# Patient Record
Sex: MALE | Race: ASIAN | NOT HISPANIC OR LATINO | URBAN - METROPOLITAN AREA
[De-identification: names, ages, dates, MRNs, and addresses within clinical notes are randomized per-mention and may not be internally consistent; named-entity substitution may affect disease eponyms.]

---

## 2021-05-24 VITALS
RESPIRATION RATE: 16 BRPM | HEIGHT: 69 IN | OXYGEN SATURATION: 97 % | SYSTOLIC BLOOD PRESSURE: 137 MMHG | HEART RATE: 68 BPM | WEIGHT: 169.98 LBS | TEMPERATURE: 98 F | DIASTOLIC BLOOD PRESSURE: 72 MMHG

## 2021-05-24 RX ORDER — CHLORHEXIDINE GLUCONATE 213 G/1000ML
1 SOLUTION TOPICAL ONCE
Refills: 0 | Status: DISCONTINUED | OUTPATIENT
Start: 2021-05-26 | End: 2021-05-26

## 2021-05-24 NOTE — H&P ADULT - HISTORY OF PRESENT ILLNESS
Cardiologist: Dr. Radha Leary  COVID:  Pharmacy: [facility] [address] [place] [address]  Escort:   History obtained via o/p office note  66 y/o M former smoker with PMH of HTN, HLD, DM, hepatitis B, PVD s/p PTA SFA and DEANNA, CAD s/p PCIs- RCA 2012 @ [place], PCI LAD and LCx 2016 @ [facility],   Who presented to her cardiologist DR. Leary with CC of dyspnea and mild chest tightness with exertion occurring on walking greater than 2 blocks or climbing greater than 1 flight of stairs and relieved with rest. Pt reports chest pain is located on substernal area, non-radiating lasting for a few minutes,   Also c/o b/l Leg claudication of the thighs with walking a couple of blocks X few months, worse with stairs and relieved with rest.   Pt. denies any …. SOB, palpitation, N/V, LE edema, syncope, PND/orthopnea. Subsequently, Pt. underwent NST 3/22/21 revealed EF 55 %, moderate amount of ischemia in anterior region on the stress images which was/were reversible in the rest images.  In light of patients risk factors, persistent CCS class 3  sx despite normal NST, pt, now presents for cardiac cath with possible intervention.    Cardiologist: Dr. Radha Leary  COVID:  Pharmacy: [facility] [address] [place] [address]  Escort:   History obtained via o/p office note  66 y/o M former smoker with PMH of HTN, HLD, DM, hepatitis B, PVD s/p PTA SFA and DEANNA, CAD s/p PCIs- RCA 2012 @ [place], PCI LAD and LCx 2016 @ [facility],   Who presented to her cardiologist DR. Leary with CC of dyspnea and mild chest tightness with exertion occurring on walking greater than 2 blocks or climbing greater than 1 flight of stairs and relieved with rest. Pt reports chest pain is located on substernal area, non-radiating lasting for a few minutes,   Also c/o b/l Leg claudication of the thighs with walking a couple of blocks X few months, worse with stairs and relieved with rest.   Pt. denies any …. SOB, palpitation, N/V, LE edema, syncope, PND/orthopnea. Subsequently, Pt. underwent NST 3/22/21 revealed EF 55 %, moderate amount of ischemia in anterior region on the stress images which was/were reversible in the rest images.  In light of patients risk factors, CCS class 3  sx ,abnormal NST, pt, now presents for cardiac cath with possible intervention.    Cardiologist: Dr. Radha Leary  COVID vaccine: Moderna 3/18/21  Pharmacy: [facility] [address] [place] [address]  Escort: Niece   History obtained via o/p office note  66 y/o Cantonese speaking M, current smoker with PMH of HTN, HLD, DM, hepatitis B, PVD s/p PTA SFA and DEANNA, CAD s/p PCIs- RCA 2012 @ [place], PCI LAD and LCx 2016 @ [facility] who presented to her cardiologist DR. Leary with CC of dyspnea and mild chest tightness with exertion occurring on walking greater than 2 blocks or climbing greater than 1 flight of stairs and relieved with rest. Pt reports chest pain is located on substernal area, non-radiating lasting for a few minutes,   Also c/o b/l Leg claudication of the thighs with walking a couple of blocks X few months, worse with stairs and relieved with rest. Pt. denies any SOB, palpitation, N/V, LE edema, syncope, PND/orthopnea. Subsequently pt. underwent NST 3/22/21 revealed EF 55 %, moderate amount of ischemia in anterior region on the stress images which was/were reversible in the rest images. In light of patients risk factors, CCS class 3  sx ,abnormal NST, pt, now presents for cardiac cath with possible intervention.

## 2021-05-24 NOTE — H&P ADULT - NSICDXPASTMEDICALHX_GEN_ALL_CORE_FT
PAST MEDICAL HISTORY:  CAD (coronary artery disease)     Diabetes mellitus     Hepatitis B     Hyperlipidemia     Hypertension     PVD (peripheral vascular disease)

## 2021-05-24 NOTE — H&P ADULT - ASSESSMENT
66 y/o Cantonese speaking M, current smoker with PMH of HTN, HLD, DM, hepatitis B, PVD s/p PTA SFA and DEANNA, CAD s/p PCIs- RCA 2012 @ [place], PCI LAD and LCx 2016 @ [facility] who  in light of patients risk factors, CCS class 3  sx ,abnormal NST, pt, now presents for cardiac cath with possible intervention.     ASA II, Mallampati II    Hgb/HCT: 14.5/44.9. Pt denies bleeding, melena, BRBPR, hematuria. Pt reports compliance with Aspirin 81mg PO QD, last dose 5/24. Last dose Xarelto 2.5mg PO QD on 5/24.   NS @ 100 cc/hr ordered. EF 55%. Pt is euvolemic on exam. Cr. 1.33 on labs from 5/24, today 1.07  MISS ordered     Pt is a candidate for moderate sedation: yes    Pt was consented with help of ray [place] in [facility].   Risks & benefits of procedure and alternative therapy have been explained to the patient including but not limited to: allergic reaction, bleeding w/possible need for blood transfusion, infection, renal and vascular compromise, limb damage, arrhythmia, stroke, vessel dissection/perforation, Myocardial infarction, emergent CABG. Informed consent obtained and in chart.

## 2021-05-24 NOTE — H&P ADULT - NSHPLABSRESULTS_GEN_ALL_CORE
EC.5   6.52  )-----------( 159      ( 26 May 2021 16:14 )             44.9           138  |  105  |  23  ----------------------------<  228<H>  4.0   |  23  |  1.07    Ca    9.8      26 May 2021 16:14    TPro  7.2  /  Alb  4.2  /  TBili  0.4  /  DBili  0.2  /  AST  14  /  ALT  16  /  AlkPhos  68  05-      PT/INR - ( 26 May 2021 16:14 )   PT: 10.5 sec;   INR: 0.87          PTT - ( 26 May 2021 16:14 )  PTT:30.9 sec ECG: NSR @ 68bpm, no STT changes.                                      14.5   6.52  )-----------( 159      ( 26 May 2021 16:14 )             44.9       05-26    138  |  105  |  23  ----------------------------<  228<H>  4.0   |  23  |  1.07    Ca    9.8      26 May 2021 16:14    TPro  7.2  /  Alb  4.2  /  TBili  0.4  /  DBili  0.2  /  AST  14  /  ALT  16  /  AlkPhos  68  05-26      PT/INR - ( 26 May 2021 16:14 )   PT: 10.5 sec;   INR: 0.87          PTT - ( 26 May 2021 16:14 )  PTT:30.9 sec

## 2021-05-26 ENCOUNTER — OUTPATIENT (OUTPATIENT)
Dept: OUTPATIENT SERVICES | Facility: HOSPITAL | Age: 66
LOS: 1 days | Discharge: NURSING, REHAB OR LONG-TERM CARE | End: 2021-05-26
Payer: MEDICARE

## 2021-05-26 DIAGNOSIS — Z98.890 OTHER SPECIFIED POSTPROCEDURAL STATES: Chronic | ICD-10-CM

## 2021-05-26 LAB
A1C WITH ESTIMATED AVERAGE GLUCOSE RESULT: 7.9 % — HIGH (ref 4–5.6)
ALBUMIN SERPL ELPH-MCNC: 3.9 G/DL — SIGNIFICANT CHANGE UP (ref 3.3–5)
ALBUMIN SERPL ELPH-MCNC: 4.2 G/DL — SIGNIFICANT CHANGE UP (ref 3.3–5)
ALP SERPL-CCNC: 63 U/L — SIGNIFICANT CHANGE UP (ref 40–120)
ALP SERPL-CCNC: 68 U/L — SIGNIFICANT CHANGE UP (ref 40–120)
ALT FLD-CCNC: 15 U/L — SIGNIFICANT CHANGE UP (ref 10–45)
ALT FLD-CCNC: 16 U/L — SIGNIFICANT CHANGE UP (ref 10–45)
ANION GAP SERPL CALC-SCNC: 10 MMOL/L — SIGNIFICANT CHANGE UP (ref 5–17)
ANION GAP SERPL CALC-SCNC: 8 MMOL/L — SIGNIFICANT CHANGE UP (ref 5–17)
APTT BLD: 30.9 SEC — SIGNIFICANT CHANGE UP (ref 27.5–35.5)
AST SERPL-CCNC: 14 U/L — SIGNIFICANT CHANGE UP (ref 10–40)
AST SERPL-CCNC: 15 U/L — SIGNIFICANT CHANGE UP (ref 10–40)
BASOPHILS # BLD AUTO: 0.02 K/UL — SIGNIFICANT CHANGE UP (ref 0–0.2)
BASOPHILS # BLD AUTO: 0.03 K/UL — SIGNIFICANT CHANGE UP (ref 0–0.2)
BASOPHILS NFR BLD AUTO: 0.3 % — SIGNIFICANT CHANGE UP (ref 0–2)
BASOPHILS NFR BLD AUTO: 0.5 % — SIGNIFICANT CHANGE UP (ref 0–2)
BILIRUB DIRECT SERPL-MCNC: 0.2 MG/DL — SIGNIFICANT CHANGE UP (ref 0–0.2)
BILIRUB INDIRECT FLD-MCNC: 0.2 MG/DL — SIGNIFICANT CHANGE UP (ref 0.2–1)
BILIRUB SERPL-MCNC: 0.4 MG/DL — SIGNIFICANT CHANGE UP (ref 0.2–1.2)
BILIRUB SERPL-MCNC: 0.5 MG/DL — SIGNIFICANT CHANGE UP (ref 0.2–1.2)
BUN SERPL-MCNC: 23 MG/DL — SIGNIFICANT CHANGE UP (ref 7–23)
BUN SERPL-MCNC: 23 MG/DL — SIGNIFICANT CHANGE UP (ref 7–23)
CALCIUM SERPL-MCNC: 9 MG/DL — SIGNIFICANT CHANGE UP (ref 8.4–10.5)
CALCIUM SERPL-MCNC: 9.8 MG/DL — SIGNIFICANT CHANGE UP (ref 8.4–10.5)
CHLORIDE SERPL-SCNC: 105 MMOL/L — SIGNIFICANT CHANGE UP (ref 96–108)
CHLORIDE SERPL-SCNC: 105 MMOL/L — SIGNIFICANT CHANGE UP (ref 96–108)
CHOLEST SERPL-MCNC: 177 MG/DL — SIGNIFICANT CHANGE UP
CO2 SERPL-SCNC: 23 MMOL/L — SIGNIFICANT CHANGE UP (ref 22–31)
CO2 SERPL-SCNC: 26 MMOL/L — SIGNIFICANT CHANGE UP (ref 22–31)
CREAT SERPL-MCNC: 0.99 MG/DL — SIGNIFICANT CHANGE UP (ref 0.5–1.3)
CREAT SERPL-MCNC: 1.07 MG/DL — SIGNIFICANT CHANGE UP (ref 0.5–1.3)
CRP SERPL-MCNC: <0.3 MG/L — SIGNIFICANT CHANGE UP (ref 0–4)
EOSINOPHIL # BLD AUTO: 0.09 K/UL — SIGNIFICANT CHANGE UP (ref 0–0.5)
EOSINOPHIL # BLD AUTO: 0.09 K/UL — SIGNIFICANT CHANGE UP (ref 0–0.5)
EOSINOPHIL NFR BLD AUTO: 1.4 % — SIGNIFICANT CHANGE UP (ref 0–6)
EOSINOPHIL NFR BLD AUTO: 1.5 % — SIGNIFICANT CHANGE UP (ref 0–6)
ESTIMATED AVERAGE GLUCOSE: 180 MG/DL — HIGH (ref 68–114)
GLUCOSE BLDC GLUCOMTR-MCNC: 177 MG/DL — HIGH (ref 70–99)
GLUCOSE BLDC GLUCOMTR-MCNC: 211 MG/DL — HIGH (ref 70–99)
GLUCOSE SERPL-MCNC: 205 MG/DL — HIGH (ref 70–99)
GLUCOSE SERPL-MCNC: 228 MG/DL — HIGH (ref 70–99)
HCT VFR BLD CALC: 44 % — SIGNIFICANT CHANGE UP (ref 39–50)
HCT VFR BLD CALC: 44.9 % — SIGNIFICANT CHANGE UP (ref 39–50)
HDLC SERPL-MCNC: 56 MG/DL — SIGNIFICANT CHANGE UP
HGB BLD-MCNC: 14.4 G/DL — SIGNIFICANT CHANGE UP (ref 13–17)
HGB BLD-MCNC: 14.5 G/DL — SIGNIFICANT CHANGE UP (ref 13–17)
IMM GRANULOCYTES NFR BLD AUTO: 0.5 % — SIGNIFICANT CHANGE UP (ref 0–1.5)
IMM GRANULOCYTES NFR BLD AUTO: 0.5 % — SIGNIFICANT CHANGE UP (ref 0–1.5)
INR BLD: 0.87 — LOW (ref 0.88–1.16)
LIPID PNL WITH DIRECT LDL SERPL: 87 MG/DL — SIGNIFICANT CHANGE UP
LYMPHOCYTES # BLD AUTO: 1.45 K/UL — SIGNIFICANT CHANGE UP (ref 1–3.3)
LYMPHOCYTES # BLD AUTO: 1.71 K/UL — SIGNIFICANT CHANGE UP (ref 1–3.3)
LYMPHOCYTES # BLD AUTO: 24.5 % — SIGNIFICANT CHANGE UP (ref 13–44)
LYMPHOCYTES # BLD AUTO: 26.2 % — SIGNIFICANT CHANGE UP (ref 13–44)
MCHC RBC-ENTMCNC: 30.2 PG — SIGNIFICANT CHANGE UP (ref 27–34)
MCHC RBC-ENTMCNC: 30.6 PG — SIGNIFICANT CHANGE UP (ref 27–34)
MCHC RBC-ENTMCNC: 32.3 GM/DL — SIGNIFICANT CHANGE UP (ref 32–36)
MCHC RBC-ENTMCNC: 32.7 GM/DL — SIGNIFICANT CHANGE UP (ref 32–36)
MCV RBC AUTO: 93.4 FL — SIGNIFICANT CHANGE UP (ref 80–100)
MCV RBC AUTO: 93.5 FL — SIGNIFICANT CHANGE UP (ref 80–100)
MONOCYTES # BLD AUTO: 0.49 K/UL — SIGNIFICANT CHANGE UP (ref 0–0.9)
MONOCYTES # BLD AUTO: 0.64 K/UL — SIGNIFICANT CHANGE UP (ref 0–0.9)
MONOCYTES NFR BLD AUTO: 8.3 % — SIGNIFICANT CHANGE UP (ref 2–14)
MONOCYTES NFR BLD AUTO: 9.8 % — SIGNIFICANT CHANGE UP (ref 2–14)
NEUTROPHILS # BLD AUTO: 3.83 K/UL — SIGNIFICANT CHANGE UP (ref 1.8–7.4)
NEUTROPHILS # BLD AUTO: 4.02 K/UL — SIGNIFICANT CHANGE UP (ref 1.8–7.4)
NEUTROPHILS NFR BLD AUTO: 61.6 % — SIGNIFICANT CHANGE UP (ref 43–77)
NEUTROPHILS NFR BLD AUTO: 64.9 % — SIGNIFICANT CHANGE UP (ref 43–77)
NON HDL CHOLESTEROL: 121 MG/DL — SIGNIFICANT CHANGE UP
NRBC # BLD: 0 /100 WBCS — SIGNIFICANT CHANGE UP (ref 0–0)
NRBC # BLD: 0 /100 WBCS — SIGNIFICANT CHANGE UP (ref 0–0)
PLATELET # BLD AUTO: 153 K/UL — SIGNIFICANT CHANGE UP (ref 150–400)
PLATELET # BLD AUTO: 159 K/UL — SIGNIFICANT CHANGE UP (ref 150–400)
POTASSIUM SERPL-MCNC: 3.7 MMOL/L — SIGNIFICANT CHANGE UP (ref 3.5–5.3)
POTASSIUM SERPL-MCNC: 4 MMOL/L — SIGNIFICANT CHANGE UP (ref 3.5–5.3)
POTASSIUM SERPL-SCNC: 3.7 MMOL/L — SIGNIFICANT CHANGE UP (ref 3.5–5.3)
POTASSIUM SERPL-SCNC: 4 MMOL/L — SIGNIFICANT CHANGE UP (ref 3.5–5.3)
PROT SERPL-MCNC: 6.7 G/DL — SIGNIFICANT CHANGE UP (ref 6–8.3)
PROT SERPL-MCNC: 7.2 G/DL — SIGNIFICANT CHANGE UP (ref 6–8.3)
PROTHROM AB SERPL-ACNC: 10.5 SEC — LOW (ref 10.6–13.6)
RBC # BLD: 4.71 M/UL — SIGNIFICANT CHANGE UP (ref 4.2–5.8)
RBC # BLD: 4.8 M/UL — SIGNIFICANT CHANGE UP (ref 4.2–5.8)
RBC # FLD: 13 % — SIGNIFICANT CHANGE UP (ref 10.3–14.5)
RBC # FLD: 13.2 % — SIGNIFICANT CHANGE UP (ref 10.3–14.5)
SODIUM SERPL-SCNC: 138 MMOL/L — SIGNIFICANT CHANGE UP (ref 135–145)
SODIUM SERPL-SCNC: 139 MMOL/L — SIGNIFICANT CHANGE UP (ref 135–145)
TRIGL SERPL-MCNC: 168 MG/DL — HIGH
WBC # BLD: 5.91 K/UL — SIGNIFICANT CHANGE UP (ref 3.8–10.5)
WBC # BLD: 6.52 K/UL — SIGNIFICANT CHANGE UP (ref 3.8–10.5)
WBC # FLD AUTO: 5.91 K/UL — SIGNIFICANT CHANGE UP (ref 3.8–10.5)
WBC # FLD AUTO: 6.52 K/UL — SIGNIFICANT CHANGE UP (ref 3.8–10.5)

## 2021-05-26 PROCEDURE — 92978 ENDOLUMINL IVUS OCT C 1ST: CPT | Mod: RC

## 2021-05-26 PROCEDURE — 85730 THROMBOPLASTIN TIME PARTIAL: CPT

## 2021-05-26 PROCEDURE — C9600: CPT | Mod: RC

## 2021-05-26 PROCEDURE — C1874: CPT

## 2021-05-26 PROCEDURE — 86140 C-REACTIVE PROTEIN: CPT

## 2021-05-26 PROCEDURE — 82962 GLUCOSE BLOOD TEST: CPT

## 2021-05-26 PROCEDURE — 93010 ELECTROCARDIOGRAM REPORT: CPT

## 2021-05-26 PROCEDURE — 80061 LIPID PANEL: CPT

## 2021-05-26 PROCEDURE — C1725: CPT

## 2021-05-26 PROCEDURE — 99221 1ST HOSP IP/OBS SF/LOW 40: CPT

## 2021-05-26 PROCEDURE — 85025 COMPLETE CBC W/AUTO DIFF WBC: CPT

## 2021-05-26 PROCEDURE — C1887: CPT

## 2021-05-26 PROCEDURE — C1769: CPT

## 2021-05-26 PROCEDURE — C1894: CPT

## 2021-05-26 PROCEDURE — 93454 CORONARY ARTERY ANGIO S&I: CPT | Mod: 59

## 2021-05-26 PROCEDURE — 82248 BILIRUBIN DIRECT: CPT

## 2021-05-26 PROCEDURE — 93005 ELECTROCARDIOGRAM TRACING: CPT

## 2021-05-26 PROCEDURE — 83036 HEMOGLOBIN GLYCOSYLATED A1C: CPT

## 2021-05-26 PROCEDURE — C1753: CPT

## 2021-05-26 PROCEDURE — 80053 COMPREHEN METABOLIC PANEL: CPT

## 2021-05-26 PROCEDURE — 85610 PROTHROMBIN TIME: CPT

## 2021-05-26 RX ORDER — DEXTROSE 50 % IN WATER 50 %
25 SYRINGE (ML) INTRAVENOUS ONCE
Refills: 0 | Status: DISCONTINUED | OUTPATIENT
Start: 2021-05-26 | End: 2021-05-26

## 2021-05-26 RX ORDER — SODIUM CHLORIDE 9 MG/ML
500 INJECTION INTRAMUSCULAR; INTRAVENOUS; SUBCUTANEOUS
Refills: 0 | Status: DISCONTINUED | OUTPATIENT
Start: 2021-05-26 | End: 2021-05-26

## 2021-05-26 RX ORDER — ASPIRIN/CALCIUM CARB/MAGNESIUM 324 MG
1 TABLET ORAL
Qty: 30 | Refills: 11
Start: 2021-05-26 | End: 2022-05-20

## 2021-05-26 RX ORDER — INSULIN LISPRO 100/ML
VIAL (ML) SUBCUTANEOUS ONCE
Refills: 0 | Status: COMPLETED | OUTPATIENT
Start: 2021-05-26 | End: 2021-05-26

## 2021-05-26 RX ORDER — CLOPIDOGREL BISULFATE 75 MG/1
600 TABLET, FILM COATED ORAL ONCE
Refills: 0 | Status: COMPLETED | OUTPATIENT
Start: 2021-05-26 | End: 2021-05-26

## 2021-05-26 RX ORDER — GLUCAGON INJECTION, SOLUTION 0.5 MG/.1ML
1 INJECTION, SOLUTION SUBCUTANEOUS ONCE
Refills: 0 | Status: DISCONTINUED | OUTPATIENT
Start: 2021-05-26 | End: 2021-05-26

## 2021-05-26 RX ORDER — SODIUM CHLORIDE 9 MG/ML
1000 INJECTION, SOLUTION INTRAVENOUS
Refills: 0 | Status: DISCONTINUED | OUTPATIENT
Start: 2021-05-26 | End: 2021-05-26

## 2021-05-26 RX ORDER — METOPROLOL TARTRATE 50 MG
100 TABLET ORAL ONCE
Refills: 0 | Status: COMPLETED | OUTPATIENT
Start: 2021-05-26 | End: 2021-05-26

## 2021-05-26 RX ORDER — CLOPIDOGREL BISULFATE 75 MG/1
1 TABLET, FILM COATED ORAL
Qty: 30 | Refills: 11
Start: 2021-05-26 | End: 2022-05-20

## 2021-05-26 RX ORDER — DEXTROSE 50 % IN WATER 50 %
12.5 SYRINGE (ML) INTRAVENOUS ONCE
Refills: 0 | Status: DISCONTINUED | OUTPATIENT
Start: 2021-05-26 | End: 2021-05-26

## 2021-05-26 RX ORDER — ASPIRIN/CALCIUM CARB/MAGNESIUM 324 MG
81 TABLET ORAL ONCE
Refills: 0 | Status: COMPLETED | OUTPATIENT
Start: 2021-05-26 | End: 2021-05-26

## 2021-05-26 RX ORDER — DEXTROSE 50 % IN WATER 50 %
15 SYRINGE (ML) INTRAVENOUS ONCE
Refills: 0 | Status: DISCONTINUED | OUTPATIENT
Start: 2021-05-26 | End: 2021-05-26

## 2021-05-26 RX ADMIN — Medication 81 MILLIGRAM(S): at 17:05

## 2021-05-26 RX ADMIN — CLOPIDOGREL BISULFATE 600 MILLIGRAM(S): 75 TABLET, FILM COATED ORAL at 17:06

## 2021-05-26 RX ADMIN — Medication 2: at 19:14

## 2021-05-26 RX ADMIN — Medication 100 MILLIGRAM(S): at 18:47

## 2021-05-26 RX ADMIN — SODIUM CHLORIDE 100 MILLILITER(S): 9 INJECTION INTRAMUSCULAR; INTRAVENOUS; SUBCUTANEOUS at 17:06

## 2021-05-26 RX ADMIN — SODIUM CHLORIDE 100 MILLILITER(S): 9 INJECTION INTRAMUSCULAR; INTRAVENOUS; SUBCUTANEOUS at 19:09

## 2021-05-26 NOTE — CONSULT NOTE ADULT - SUBJECTIVE AND OBJECTIVE BOX
Surgeon:    Requesting Physician:    HISTORY OF PRESENT ILLNESS:  65y Male    PAST MEDICAL & SURGICAL HISTORY:  Hypertension    Hyperlipidemia    Hepatitis B    PVD (peripheral vascular disease)    Diabetes mellitus    CAD (coronary artery disease)    H/O eye surgery        MEDICATIONS  (STANDING):  chlorhexidine 4% Liquid 1 Application(s) Topical once  dextrose 40% Gel 15 Gram(s) Oral Once  dextrose 5%. 1000 milliLiter(s) (50 mL/Hr) IV Continuous <Continuous>  dextrose 5%. 1000 milliLiter(s) (100 mL/Hr) IV Continuous <Continuous>  dextrose 50% Injectable 25 Gram(s) IV Push Once  dextrose 50% Injectable 12.5 Gram(s) IV Push Once  dextrose 50% Injectable 25 Gram(s) IV Push Once  glucagon  Injectable 1 milliGRAM(s) IntraMuscular Once  insulin lispro (ADMELOG) corrective regimen sliding scale   SubCutaneous Once  sodium chloride 0.9%. 500 milliLiter(s) (100 mL/Hr) IV Continuous <Continuous>    MEDICATIONS  (PRN):      Allergies    No Known Allergies    Intolerances        SOCIAL HISTORY:  Smoker:  YES / NO        PACK YEARS:                         WHEN QUIT?  ETOH use:  YES / NO               FREQUENCY / QUANTITY:  Ilicit Drug use:  YES / NO  Occupation:  Assisted device use (Cane / Walker):  Live with:    FAMILY HISTORY:  No pertinent family history in first degree relatives        Review of Systems  CONSTITUTIONAL:  Fevers / chills, sweats, fatigue, weight loss, weight gain                                    NEGATIVE  NEURO:  parathesias, seizures, syncope, confusion                                                                               NEGATIVE  EYES:  Blurry vision, discharge, pain, loss of vision                                                                                  NEGATIVE  ENMT:  Difficulty hearing, vertigo, dysphagia, epistaxis, recent dental work                                     NEGATIVE  CV:  Chest pain, palpitations, ALLRED, orthopnea                                                                                         NEGATIVE  RESPIRATORY:  Wheezing, SOB, cough / sputum, hemoptysis                                                              NEGATIVE  GI:  Nausea, vommiting, diarrhea, constipation, melena                                                                        NEGATIVE  : Hematuria, dysuria, urgency, incontinence                                                                                       NEGATIVE  MUSKULOSKELETAL:  arthritis, joint swelling, muscle weakness                                                           NEGATIVE  SKIN/BREAST:  rash, itching, humberto loss, masses                                                                                            NEGATIVE  PSYCH:  depresion, anxiety, suicidal ideation                                                                                             NEGATIVE  HEME/LYMPH:  bruises easily, enlarged lymph nodes, tender lymph nodes                                        NEGATIVE  ENDOCRINE:  cold intolerance, heat intolerance, polydipsia                                                                   NEGATIVE    PHYSICAL EXAM  Vital Signs Last 24 Hrs  T(C): --  T(F): --  HR: --  BP: --  BP(mean): --  RR: --  SpO2: --    CONSTITUTIONAL:                                                                          WNL  NEURO:                                                                                             WNL                      EYES:                                                                                                  WNL  ENMT:                                                                                                WNL  CV:                                                                                                      WNL  RESPIRATORY:                                                                                  WNL  GI:                                                                                                       WNL  : CARBAJAL + / -                                                                                 WNL  MUSKULOSKELETAL:                                                                       WNL  SKIN / BREAST:                                                                                 WNL                                                          LABS:                        14.5   6.52  )-----------( 159      ( 26 May 2021 16:14 )             44.9     05-26    138  |  105  |  23  ----------------------------<  228<H>  4.0   |  23  |  1.07    Ca    9.8      26 May 2021 16:14    TPro  7.2  /  Alb  4.2  /  TBili  0.4  /  DBili  0.2  /  AST  14  /  ALT  16  /  AlkPhos  68  05-26    PT/INR - ( 26 May 2021 16:14 )   PT: 10.5 sec;   INR: 0.87    PTT - ( 26 May 2021 16:14 )  PTT:30.9 sec      RADIOLOGY & ADDITIONAL STUDIES:  CAROTID U/S:    CXR:    CT Scan:    EKG:    TTE / YONY:    Cardiac Cath: Surgeon: MD Jignesh    Requesting Physician: MD Thor    HISTORY OF PRESENT ILLNESS:  65y Male who is a formal smoker with PMH significant for HTN, HLD, DMT2, hepatitis B, PVD with s/p PTA SFA and DEANNA, CAD with s/p PCIs to RCA (2012, [place]), PCI to LAD and Lcx (2016 at [facility]) and TR presented to CT surgery with findings of worsening coronary stenosis with symptoms of decrease daily activities, shortness of breath, chest tightness on exertion, chest pain at mid sternum without radiating, LE edema and claudication.  Patient underwent a diagnostic catheterization and PCI to RCA at this admission.  Patient tolerated the procedure well.  At visit, patient is hemodynamically stable with complains of some chest tightness which is re-ensure a normal sensation post procedures.  Patient otherwise denies shortness of breath, claudication, or numbness of extremities.         PAST MEDICAL & SURGICAL HISTORY:  Hypertension    Hyperlipidemia    Hepatitis B    PVD (peripheral vascular disease)    Diabetes mellitus    CAD (coronary artery disease)    H/O eye surgery        MEDICATIONS  (STANDING):  chlorhexidine 4% Liquid 1 Application(s) Topical once  dextrose 40% Gel 15 Gram(s) Oral Once  dextrose 5%. 1000 milliLiter(s) (50 mL/Hr) IV Continuous <Continuous>  dextrose 5%. 1000 milliLiter(s) (100 mL/Hr) IV Continuous <Continuous>  dextrose 50% Injectable 25 Gram(s) IV Push Once  dextrose 50% Injectable 12.5 Gram(s) IV Push Once  dextrose 50% Injectable 25 Gram(s) IV Push Once  glucagon  Injectable 1 milliGRAM(s) IntraMuscular Once  insulin lispro (ADMELOG) corrective regimen sliding scale   SubCutaneous Once  sodium chloride 0.9%. 500 milliLiter(s) (100 mL/Hr) IV Continuous <Continuous>    MEDICATIONS  (PRN):      Allergies    No Known Allergies    Intolerances        SOCIAL HISTORY:  Smoker: formal smoking   ETOH use: no  Ilicit Drug use:  YES / NO  Occupation:  Assisted device use (Cane / Walker):  Live with:    FAMILY HISTORY:  No pertinent family history in first degree relatives        Review of Systems  CONSTITUTIONAL:  Fevers / chills, sweats, fatigue, weight loss, weight gain                                    NEURO:  parathesias, seizures, syncope, confusion                                                                               EYES:  Blurry vision, discharge, pain, loss of vision                                                                                  ENMT:  Difficulty hearing, vertigo, dysphagia, epistaxis, recent dental work                                       CV:  Chest pain, palpitations, ALLRED, orthopnea                                                                                           RESPIRATORY:  Wheezing, SOB, cough / sputum, hemoptysis                                                              GI:  Nausea, vommiting, diarrhea, constipation, melena                                                                         : Hematuria, dysuria, urgency, incontinence                                                                                        MUSKULOSKELETAL:  arthritis, joint swelling, muscle weakness                                                             SKIN/BREAST:  rash, itching, humberto loss, masses                                                                                          PSYCH:  depresion, anxiety, suicidal ideation                                                                                             HEME/LYMPH:  bruises easily, enlarged lymph nodes, tender lymph nodes                                         ENDOCRINE:  cold intolerance, heat intolerance, polydipsia                                                                      · Temp (F)	97.9 Degrees F  · Temp (C)	36.6 Degrees C  · Heart Rate	68 /min  · Respiration Rate (breaths/min)	16 /min  · BP Systolic	137 mm Hg  · BP Diastolic	72 mm Hg  · Blood Pressure - Method	electronic  · BP Noninvasive Mean	93 mm Hg  · SpO2 (%)	97 %  · O2 Delivery/Oxygen Delivery Method	room air     CONSTITUTIONAL: NAD.  WDWN                                                                   NEURO:     grossly intact                                                                                                     EYES:   slight closed.  left eye decrease vision                                                                                         ENMT:  supple and non lymphadenopathy                                                                                            CV:     RRR without murmur                                                                                                   RESPIRATORY:   no wheezing and non rhonchi                                                                          GI:         obese and non distended                                                                                              :  deferred                                                                                MUSKULOSKELETAL: no deformity                                                                        SKIN / BREAST:      warm                                                                                                                                    LABS:                        14.5   6.52  )-----------( 159      ( 26 May 2021 16:14 )             44.9     05-26    138  |  105  |  23  ----------------------------<  228<H>  4.0   |  23  |  1.07    Ca    9.8      26 May 2021 16:14    TPro  7.2  /  Alb  4.2  /  TBili  0.4  /  DBili  0.2  /  AST  14  /  ALT  16  /  AlkPhos  68  05-26    PT/INR - ( 26 May 2021 16:14 )   PT: 10.5 sec;   INR: 0.87    PTT - ( 26 May 2021 16:14 )  PTT:30.9 sec      RADIOLOGY & ADDITIONAL STUDIES:  EKG   performed and reviewed     Cardiac Cath:  3-V coronary stenosis     Echo

## 2021-05-26 NOTE — CONSULT NOTE ADULT - ASSESSMENT
65y Male who is a formal smoker with PMH significant for HTN, HLD, DMT2, hepatitis B, PVD with s/p PTA SFA and DEANNA, CAD with s/p PCIs to RCA (2012, [place]), PCI to LAD and Lcx (2016 at [facility]) and TR presented to CT surgery with findings of worsening coronary stenosis with symptoms of decrease daily activities, shortness of breath, chest tightness on exertion, chest pain at mid sternum without radiating, LE edema and claudication.  Patient underwent a diagnostic catheterization and PCI to RCA at this admission.  Patient tolerated the procedure well.  At visit, patient is hemodynamically stable with complains of some chest tightness which is re-ensure a normal sensation post procedures.  Patient otherwise denies shortness of breath, claudication, or numbness of extremities.         Problem #1 CAD with s/p PCI to RCA  - surgical intervention with Dr. Egan in near future   - continue ASA/Plavix post procedures  - continue statin post procedures  - continue beta blocker.  consider to switch to metoprolol  - continue to monitor signs and symptoms of Myocardial ischemia     Problem #2 PVD with claudication   - continue walking per tolerance to increase perfusion     Problem #3 DMT2  - continue current anti-hyperglycemic agent   - well control blood sugar prior to the procedures     Problem #4 LE edema   - consider gentle diuresis for now

## 2021-05-26 NOTE — PROGRESS NOTE ADULT - SUBJECTIVE AND OBJECTIVE BOX
Interventional Cardiology PA Post PCI SDA Discharge Note      Patient without complaints. Ambulated and voided without difficulties    Afebrile, VSS    Ext:		Right/Left              Radial :    hematoma,     bleeding, dressing; C/D/I      Pulses:    intact RAD/DP/PT to baseline     s/p cardiac cath on 5/26/21 ASHWIN bong MaciasLABRANDON ISR     1.	Follow-up with PMD/Cardiologist ___________ in 72 hours.  2.                 Post procedure labs/EKG reviewed and stable.    3.                 Pt given instructions on importance of taking antiplatelet medication.    4. 	Stable for discharge as per attending . _________ after bed rest, pt voids, groin/wrist stable and 30 minutes of ambulation.  5.                 Prescriptions for Aspirin/Plavix/Brilinta/Effient e-prescribed and submitted to patient's pharmacy.    6.                 Patient will continue statin (Name and dose).  No Statin Prescribed due to ____________.  7.	Discharge forms signed and copies in chart     Interventional Cardiology PA Post PCI SDA Discharge Note      Patient without complaints. Ambulated and voided without difficulties    Afebrile, VSS    Ext:		Right/Left              Radial :    hematoma,     bleeding, dressing; C/D/I      Pulses:    intact RAD/DP/PT to baseline     s/p cardiac cath on 5/26/21 ASHWIN proxRCA LM norm, proxLAD 95% ISR, proxLCx 80-85% prixRCA 85% in stent segment, LVEDP 13mmHg     Will follow up w/ Dr. Egan for Mid CAB    1.	Follow-up with PMD/Cardiologist Dr Leary  in 72 hours.  2.                 Post procedure labs/EKG reviewed and stable.    3.                 Pt given instructions on importance of taking antiplatelet medication.    4. 	Stable for discharge as per attending Dr. Mcrae after bed rest, pt voids, groin/wrist stable and 30 minutes of ambulation.  5.                 Prescriptions for Aspirin/Plavix e-prescribed and submitted to patient's pharmacy.    6.                 Patient will continue vascepa   7.	Discharge forms signed and copies in chart

## 2021-05-28 PROBLEM — E78.5 HYPERLIPIDEMIA, UNSPECIFIED: Chronic | Status: ACTIVE | Noted: 2021-05-26

## 2021-05-28 PROBLEM — I10 ESSENTIAL (PRIMARY) HYPERTENSION: Chronic | Status: ACTIVE | Noted: 2021-05-26

## 2021-05-28 PROBLEM — B19.10 UNSPECIFIED VIRAL HEPATITIS B WITHOUT HEPATIC COMA: Chronic | Status: ACTIVE | Noted: 2021-05-26

## 2021-05-28 PROBLEM — I73.9 PERIPHERAL VASCULAR DISEASE, UNSPECIFIED: Chronic | Status: ACTIVE | Noted: 2021-05-26

## 2021-05-28 PROBLEM — E11.9 TYPE 2 DIABETES MELLITUS WITHOUT COMPLICATIONS: Chronic | Status: ACTIVE | Noted: 2021-05-26

## 2021-05-28 PROBLEM — Z00.00 ENCOUNTER FOR PREVENTIVE HEALTH EXAMINATION: Status: ACTIVE | Noted: 2021-05-28

## 2021-05-28 PROBLEM — I25.10 ATHEROSCLEROTIC HEART DISEASE OF NATIVE CORONARY ARTERY WITHOUT ANGINA PECTORIS: Chronic | Status: ACTIVE | Noted: 2021-05-26

## 2021-06-01 VITALS — BODY MASS INDEX: 25.18 KG/M2 | WEIGHT: 169.98 LBS | HEIGHT: 69.02 IN

## 2021-06-01 DIAGNOSIS — Z86.79 PERSONAL HISTORY OF OTHER DISEASES OF THE CIRCULATORY SYSTEM: ICD-10-CM

## 2021-06-01 DIAGNOSIS — Z86.39 PERSONAL HISTORY OF OTHER ENDOCRINE, NUTRITIONAL AND METABOLIC DISEASE: ICD-10-CM

## 2021-06-01 DIAGNOSIS — F17.200 NICOTINE DEPENDENCE, UNSPECIFIED, UNCOMPLICATED: ICD-10-CM

## 2021-06-01 DIAGNOSIS — Z86.19 PERSONAL HISTORY OF OTHER INFECTIOUS AND PARASITIC DISEASES: ICD-10-CM

## 2021-06-01 DIAGNOSIS — Z87.438 PERSONAL HISTORY OF OTHER DISEASES OF MALE GENITAL ORGANS: ICD-10-CM

## 2021-06-01 DIAGNOSIS — Z95.5 PRESENCE OF CORONARY ANGIOPLASTY IMPLANT AND GRAFT: ICD-10-CM

## 2021-06-01 DIAGNOSIS — I20.9 ANGINA PECTORIS, UNSPECIFIED: ICD-10-CM

## 2021-06-01 DIAGNOSIS — I25.10 ATHEROSCLEROTIC HEART DISEASE OF NATIVE CORONARY ARTERY W/OUT ANGINA PECTORIS: ICD-10-CM

## 2021-06-01 DIAGNOSIS — T82.855A STENOSIS OF CORONARY ARTERY STENT, INITIAL ENCOUNTER: ICD-10-CM

## 2021-06-02 DIAGNOSIS — T82.855A STENOSIS OF CORONARY ARTERY STENT, INITIAL ENCOUNTER: ICD-10-CM

## 2021-06-02 DIAGNOSIS — I73.9 PERIPHERAL VASCULAR DISEASE, UNSPECIFIED: ICD-10-CM

## 2021-06-02 DIAGNOSIS — I25.10 ATHEROSCLEROTIC HEART DISEASE OF NATIVE CORONARY ARTERY WITHOUT ANGINA PECTORIS: ICD-10-CM

## 2021-06-02 DIAGNOSIS — Z95.5 PRESENCE OF CORONARY ANGIOPLASTY IMPLANT AND GRAFT: ICD-10-CM

## 2021-06-02 DIAGNOSIS — E78.5 HYPERLIPIDEMIA, UNSPECIFIED: ICD-10-CM

## 2021-06-02 DIAGNOSIS — I10 ESSENTIAL (PRIMARY) HYPERTENSION: ICD-10-CM

## 2021-06-08 ENCOUNTER — NON-APPOINTMENT (OUTPATIENT)
Age: 66
End: 2021-06-08

## 2021-06-08 ENCOUNTER — OUTPATIENT (OUTPATIENT)
Dept: OUTPATIENT SERVICES | Facility: HOSPITAL | Age: 66
LOS: 1 days | End: 2021-06-08
Payer: MEDICARE

## 2021-06-08 ENCOUNTER — APPOINTMENT (OUTPATIENT)
Dept: CARDIOTHORACIC SURGERY | Facility: CLINIC | Age: 66
End: 2021-06-08
Payer: MEDICARE

## 2021-06-08 VITALS
HEART RATE: 93 BPM | OXYGEN SATURATION: 95 % | BODY MASS INDEX: 33.77 KG/M2 | SYSTOLIC BLOOD PRESSURE: 134 MMHG | WEIGHT: 172 LBS | HEIGHT: 60 IN | TEMPERATURE: 98 F | DIASTOLIC BLOOD PRESSURE: 75 MMHG | RESPIRATION RATE: 17 BRPM

## 2021-06-08 DIAGNOSIS — Z98.890 OTHER SPECIFIED POSTPROCEDURAL STATES: Chronic | ICD-10-CM

## 2021-06-08 LAB
A1C WITH ESTIMATED AVERAGE GLUCOSE RESULT: 7.9 % — HIGH (ref 4–5.6)
ALBUMIN SERPL ELPH-MCNC: 4.2 G/DL — SIGNIFICANT CHANGE UP (ref 3.3–5)
ALP SERPL-CCNC: 74 U/L — SIGNIFICANT CHANGE UP (ref 40–120)
ALT FLD-CCNC: 17 U/L — SIGNIFICANT CHANGE UP (ref 10–45)
ANION GAP SERPL CALC-SCNC: 10 MMOL/L — SIGNIFICANT CHANGE UP (ref 5–17)
APPEARANCE UR: CLEAR — SIGNIFICANT CHANGE UP
APTT BLD: 29.5 SEC — SIGNIFICANT CHANGE UP (ref 27.5–35.5)
AST SERPL-CCNC: 15 U/L — SIGNIFICANT CHANGE UP (ref 10–40)
BACTERIA # UR AUTO: PRESENT /HPF
BASOPHILS # BLD AUTO: 0.03 K/UL — SIGNIFICANT CHANGE UP (ref 0–0.2)
BASOPHILS NFR BLD AUTO: 0.5 % — SIGNIFICANT CHANGE UP (ref 0–2)
BILIRUB SERPL-MCNC: 0.3 MG/DL — SIGNIFICANT CHANGE UP (ref 0.2–1.2)
BILIRUB UR-MCNC: NEGATIVE — SIGNIFICANT CHANGE UP
BUN SERPL-MCNC: 32 MG/DL — HIGH (ref 7–23)
CALCIUM SERPL-MCNC: 9.4 MG/DL — SIGNIFICANT CHANGE UP (ref 8.4–10.5)
CHLORIDE SERPL-SCNC: 104 MMOL/L — SIGNIFICANT CHANGE UP (ref 96–108)
CHOLEST SERPL-MCNC: 165 MG/DL — SIGNIFICANT CHANGE UP
CO2 SERPL-SCNC: 25 MMOL/L — SIGNIFICANT CHANGE UP (ref 22–31)
COD CRY URNS QL: ABNORMAL /HPF
COLOR SPEC: YELLOW — SIGNIFICANT CHANGE UP
CREAT SERPL-MCNC: 1.21 MG/DL — SIGNIFICANT CHANGE UP (ref 0.5–1.3)
DIFF PNL FLD: NEGATIVE — SIGNIFICANT CHANGE UP
EOSINOPHIL # BLD AUTO: 0.1 K/UL — SIGNIFICANT CHANGE UP (ref 0–0.5)
EOSINOPHIL NFR BLD AUTO: 1.5 % — SIGNIFICANT CHANGE UP (ref 0–6)
EPI CELLS # UR: SIGNIFICANT CHANGE UP /HPF (ref 0–5)
ESTIMATED AVERAGE GLUCOSE: 180 MG/DL — HIGH (ref 68–114)
GLUCOSE SERPL-MCNC: 149 MG/DL — HIGH (ref 70–99)
GLUCOSE UR QL: 500
HBV SURFACE AG SER-ACNC: REACTIVE
HCT VFR BLD CALC: 44.3 % — SIGNIFICANT CHANGE UP (ref 39–50)
HDLC SERPL-MCNC: 50 MG/DL — SIGNIFICANT CHANGE UP
HGB BLD-MCNC: 14.4 G/DL — SIGNIFICANT CHANGE UP (ref 13–17)
IMM GRANULOCYTES NFR BLD AUTO: 0.6 % — SIGNIFICANT CHANGE UP (ref 0–1.5)
INR BLD: 0.82 — LOW (ref 0.88–1.16)
KETONES UR-MCNC: NEGATIVE — SIGNIFICANT CHANGE UP
LEUKOCYTE ESTERASE UR-ACNC: NEGATIVE — SIGNIFICANT CHANGE UP
LIPID PNL WITH DIRECT LDL SERPL: 82 MG/DL — SIGNIFICANT CHANGE UP
LYMPHOCYTES # BLD AUTO: 1.43 K/UL — SIGNIFICANT CHANGE UP (ref 1–3.3)
LYMPHOCYTES # BLD AUTO: 21.7 % — SIGNIFICANT CHANGE UP (ref 13–44)
MCHC RBC-ENTMCNC: 30.7 PG — SIGNIFICANT CHANGE UP (ref 27–34)
MCHC RBC-ENTMCNC: 32.5 GM/DL — SIGNIFICANT CHANGE UP (ref 32–36)
MCV RBC AUTO: 94.5 FL — SIGNIFICANT CHANGE UP (ref 80–100)
MONOCYTES # BLD AUTO: 0.54 K/UL — SIGNIFICANT CHANGE UP (ref 0–0.9)
MONOCYTES NFR BLD AUTO: 8.2 % — SIGNIFICANT CHANGE UP (ref 2–14)
NEUTROPHILS # BLD AUTO: 4.45 K/UL — SIGNIFICANT CHANGE UP (ref 1.8–7.4)
NEUTROPHILS NFR BLD AUTO: 67.5 % — SIGNIFICANT CHANGE UP (ref 43–77)
NITRITE UR-MCNC: NEGATIVE — SIGNIFICANT CHANGE UP
NON HDL CHOLESTEROL: 115 MG/DL — SIGNIFICANT CHANGE UP
NRBC # BLD: 0 /100 WBCS — SIGNIFICANT CHANGE UP (ref 0–0)
PH UR: 5.5 — SIGNIFICANT CHANGE UP (ref 5–8)
PLATELET # BLD AUTO: 177 K/UL — SIGNIFICANT CHANGE UP (ref 150–400)
POTASSIUM SERPL-MCNC: 4.4 MMOL/L — SIGNIFICANT CHANGE UP (ref 3.5–5.3)
POTASSIUM SERPL-SCNC: 4.4 MMOL/L — SIGNIFICANT CHANGE UP (ref 3.5–5.3)
PROT SERPL-MCNC: 7.3 G/DL — SIGNIFICANT CHANGE UP (ref 6–8.3)
PROT UR-MCNC: ABNORMAL MG/DL
PROTHROM AB SERPL-ACNC: 9.9 SEC — LOW (ref 10.6–13.6)
RBC # BLD: 4.69 M/UL — SIGNIFICANT CHANGE UP (ref 4.2–5.8)
RBC # FLD: 13.1 % — SIGNIFICANT CHANGE UP (ref 10.3–14.5)
RBC CASTS # UR COMP ASSIST: < 5 /HPF — SIGNIFICANT CHANGE UP
SODIUM SERPL-SCNC: 139 MMOL/L — SIGNIFICANT CHANGE UP (ref 135–145)
SP GR SPEC: 1.02 — SIGNIFICANT CHANGE UP (ref 1–1.03)
TRIGL SERPL-MCNC: 165 MG/DL — HIGH
TSH SERPL-MCNC: 1.78 UIU/ML — SIGNIFICANT CHANGE UP (ref 0.27–4.2)
UROBILINOGEN FLD QL: 0.2 E.U./DL — SIGNIFICANT CHANGE UP
WBC # BLD: 6.59 K/UL — SIGNIFICANT CHANGE UP (ref 3.8–10.5)
WBC # FLD AUTO: 6.59 K/UL — SIGNIFICANT CHANGE UP (ref 3.8–10.5)
WBC UR QL: < 5 /HPF — SIGNIFICANT CHANGE UP

## 2021-06-08 PROCEDURE — 71046 X-RAY EXAM CHEST 2 VIEWS: CPT

## 2021-06-08 PROCEDURE — 86850 RBC ANTIBODY SCREEN: CPT

## 2021-06-08 PROCEDURE — 87340 HEPATITIS B SURFACE AG IA: CPT

## 2021-06-08 PROCEDURE — 83036 HEMOGLOBIN GLYCOSYLATED A1C: CPT

## 2021-06-08 PROCEDURE — 86900 BLOOD TYPING SEROLOGIC ABO: CPT

## 2021-06-08 PROCEDURE — 71046 X-RAY EXAM CHEST 2 VIEWS: CPT | Mod: 26

## 2021-06-08 PROCEDURE — 36415 COLL VENOUS BLD VENIPUNCTURE: CPT

## 2021-06-08 PROCEDURE — 84443 ASSAY THYROID STIM HORMONE: CPT

## 2021-06-08 PROCEDURE — 86901 BLOOD TYPING SEROLOGIC RH(D): CPT

## 2021-06-08 PROCEDURE — 85025 COMPLETE CBC W/AUTO DIFF WBC: CPT

## 2021-06-08 PROCEDURE — 80053 COMPREHEN METABOLIC PANEL: CPT

## 2021-06-08 PROCEDURE — 80061 LIPID PANEL: CPT

## 2021-06-08 PROCEDURE — 85730 THROMBOPLASTIN TIME PARTIAL: CPT

## 2021-06-08 PROCEDURE — 85610 PROTHROMBIN TIME: CPT

## 2021-06-08 PROCEDURE — 99215 OFFICE O/P EST HI 40 MIN: CPT

## 2021-06-08 PROCEDURE — 81001 URINALYSIS AUTO W/SCOPE: CPT

## 2021-06-08 RX ORDER — RIVAROXABAN 2.5 MG/1
2.5 TABLET, FILM COATED ORAL TWICE DAILY
Refills: 0 | Status: COMPLETED | COMMUNITY
End: 2021-06-08

## 2021-06-09 NOTE — HISTORY OF PRESENT ILLNESS
[FreeTextEntry1] : consult performed with telephone interpretor Hamida #[id number]\par \par 66 y/o Cantonese speaking male, current smoker presents with PMH of HTN, HLD, DM, hepatitis B, PVD s/p PTA SFA and DEANNA, CAD s/p PCIs- RCA 2012 @ [place], PCI LAD and LCx 2016 @ [facility]. He  presented to cardiologist Dr. Radha Leary with c/o  dyspnea and mild chest tightness with exertion occurring on walking greater than 2 blocks or climbing greater than 1 flight of stairs. Symptoms relieved with rest. CCS III.  Pt reports chest pain is located on substernal area, non-radiating lasting for a few minutes. He also reports b/l Leg claudication of the thighs with walking a couple of blocks X few months, worse with stairs and relieved with rest. Pt. denies any SOB, palpitation, N/V, LE edema, syncope, PND/orthopnea. Subsequently pt. underwent NST 3/22/21 revealed EF 55 %, moderate amount of ischemia in anterior region on the stress images which was/were reversible in the rest images. 5/26/21 s/p  cardiac cath that revealed severe 3 vessel CAD w/ISR. s/p ASHWIN->proxRCA.\par \par Patient presents today for follow up visit to discuss MIDCAB as part of hybrid procedure. He appears well, NAD.\par \par \par COVID vaccine: Moderna 3/18/21

## 2021-06-09 NOTE — ASSESSMENT
[FreeTextEntry1] : 65 year old male with PMH of HTN, HLD, DM, hepatitis B, PVD and CAD with 3 vessel CAD. He is s/p ASHWIN->proximal RCA with residual LAD ISR and proximal LCX stenosis.  Dr. Egan reviewed the cardiac cath imaging and reports with the patient and discussed the case with Dr. Mcrae and Dr. Leary.  Dr. Egan discussed the risks, benefits and alternatives to surgery. Risks include but not limited to death, heart attack, bleeding, stroke, kidney problems and infection. He quoted a 1% operative mortality and complication risk.  He also discussed the various approaches, minimally invasive versus sternotomy. Dr. Egan feels the patient will benefit and is a candidate for a robotic assisted MIDCAB (LIMA->LAD) as part of hybrid procedure. All questions were addressed and patient agrees to proceed with surgery. \par \par Plan: \par PST today->labs, u/a, pa/lat chest xray\par SDA 6/28\par patient instruction to take toprol  on morning of surgery\par instructions provided re antibacterial showers and pt given 3 sponges\par pt instructed on use of the incentive spirometer and demonstrated use to 1500cc\par \par

## 2021-06-09 NOTE — PHYSICAL EXAM
[Sclera] : the sclera and conjunctiva were normal [PERRL With Normal Accommodation] : pupils were equal in size, round, and reactive to light [Extraocular Movements] : extraocular movements were intact [Neck Appearance] : the appearance of the neck was normal [Neck Cervical Mass (___cm)] : no neck mass was observed [Jugular Venous Distention Increased] : there was no jugular-venous distention [Thyroid Diffuse Enlargement] : the thyroid was not enlarged [Thyroid Nodule] : there were no palpable thyroid nodules [Auscultation Breath Sounds / Voice Sounds] : lungs were clear to auscultation bilaterally [Heart Rate And Rhythm] : heart rate was normal and rhythm regular [Heart Sounds] : normal S1 and S2 [Heart Sounds Gallop] : no gallops [Heart Sounds Pericardial Friction Rub] : no pericardial rub [Murmurs] : no murmurs [Examination Of The Chest] : the chest was normal in appearance [Chest Visual Inspection Thoracic Asymmetry] : no chest asymmetry [Diminished Respiratory Excursion] : normal chest expansion [1+] : left 1+ [2+] : left 2+ [No Abnormalities] : the abdominal aorta was not enlarged and no bruit was heard [Bowel Sounds] : normal bowel sounds [Abdomen Tenderness] : non-tender [Abdomen Soft] : soft [Abdomen Mass (___ Cm)] : no abdominal mass palpated [No CVA Tenderness] : no ~M costovertebral angle tenderness [No Spinal Tenderness] : no spinal tenderness [Abnormal Walk] : normal gait [Nail Clubbing] : no clubbing  or cyanosis of the fingernails [Musculoskeletal - Swelling] : no joint swelling seen [Motor Tone] : muscle strength and tone were normal [Skin Color & Pigmentation] : normal skin color and pigmentation [Skin Turgor] : normal skin turgor [] : no rash [Deep Tendon Reflexes (DTR)] : deep tendon reflexes were 2+ and symmetric [Sensation] : the sensory exam was normal to light touch and pinprick [No Focal Deficits] : no focal deficits [Oriented To Time, Place, And Person] : oriented to person, place, and time [Affect] : the affect was normal [Impaired Insight] : insight and judgment were intact [Right Carotid Bruit] : no bruit heard over the right carotid [Left Carotid Bruit] : no bruit heard over the left carotid [Right Femoral Bruit] : no bruit heard over the right femoral artery [Left Femoral Bruit] : no bruit heard over the left femoral artery

## 2021-06-09 NOTE — DATA REVIEWED
[FreeTextEntry1] : 5/26/21 Cardiac cath: severe 3 vessel CAD, LM norm, prox  LAD 95% ISR, prox LCx 80-85% prox RCA 85% ISR,  s/p ASHWIN->proxRCA, LVEDP 13mmHg

## 2021-06-09 NOTE — END OF VISIT
[Time Spent: ___ minutes] : I have spent [unfilled] minutes of time on the encounter. [FreeTextEntry3] : I, MONIKA GALINDO , am scribing for and in the presence of KENNETH GRANDE the following sections: History of present illness, past Medical/family/surgical/family/social history, review of systems, vital signs, physical exam and disposition.\par I personally performed the services described in the documentation, reviewed the documentation recorded by the scribe in my presence and it accurately and completely records my words and actions.\par

## 2021-06-18 VITALS
HEIGHT: 69.02 IN | OXYGEN SATURATION: 95 % | WEIGHT: 169.76 LBS | RESPIRATION RATE: 18 BRPM | SYSTOLIC BLOOD PRESSURE: 134 MMHG | DIASTOLIC BLOOD PRESSURE: 75 MMHG | TEMPERATURE: 98 F | HEART RATE: 93 BPM

## 2021-06-18 NOTE — H&P ADULT - NSHPLABSRESULTS_GEN_ALL_CORE
Hgb A1C = 7.9  creat = 1.21  hct= 44.3  hgb= 14.4  plt= 177  WBC= 6.59  INR=0.82  tot alb= 4.2  tot bili= 0.3    TSH= 1.780    6/8/21 Chest xray: cardiomegaly. Left basilar focal atelectasis. mild scoliosis    5/26/21 EKG: SR, 78 bpm    3/9/21 Echo: EF 57%, mild LVH, grade 1 diastolic dysfunction, trace TR    5/26/21 Cardiac cath: severe 3 vessel CAD, LM norm, prox LAD 95% ISR, prox LCx 80-85% prox RCA 85% ISR, s/p ASHWIN->proxRCA, LVEDP 13mmHg.

## 2021-06-18 NOTE — H&P ADULT - HISTORY OF PRESENT ILLNESS
64 y/o Cantonese speaking male, current smoker presents with PMH of HTN, HLD, DM, hepatitis B, PVD s/p PTA SFA and DEANNA, CAD s/p PCIs- RCA 2012 @ [place], PCI LAD and LCx 2016 @ [facility]. He presented to cardiologist Dr. Radha Leary with c/o dyspnea and mild chest tightness with exertion occurring on walking greater than 2 blocks or climbing greater than 1 flight of stairs. Symptoms relieved with rest. CCS III. Pt reports chest pain is located on substernal area, non-radiating lasting for a few minutes. He also reports b/l Leg claudication of the thighs with walking a couple of blocks X few months, worse with stairs and relieved with rest. Pt. denies any SOB, palpitation, N/V, LE edema, syncope, PND/orthopnea. Subsequently pt. underwent NST 3/22/21 revealed EF 55 %, moderate amount of ischemia in anterior region on the stress images which was/were reversible in the rest images. 5/26/21 s/p cardiac cath that revealed severe 3 vessel CAD w/ISR. s/p ASHWIN->proxRCA.    Patient was seen in the outpatient office by Dr. Egan and now presents for elective surgery.     COVID vaccine: Moderna 3/18/21    64 y/o Cantonese speaking male, current smoker presents with PMH of HTN, HLD, DM, hepatitis B, PVD s/p PTA SFA and DEANNA, CAD s/p PCIs- RCA 2012 @ [place], PCI LAD and LCx 2016 @ [facility]. He presented to cardiologist Dr. Radha Leary with c/o dyspnea and mild chest tightness with exertion occurring on walking greater than 2 blocks or climbing greater than 1 flight of stairs. Symptoms relieved with rest. CCS III. Pt reports chest pain is located on substernal area, non-radiating lasting for a few minutes. He also reports b/l Leg claudication of the thighs with walking a couple of blocks X few months, worse with stairs and relieved with rest. Pt. denies any SOB, palpitation, N/V, LE edema, syncope, PND/orthopnea. Subsequently pt. underwent NST 3/22/21 revealed EF 55 %, moderate amount of ischemia in anterior region on the stress images which was/were reversible in the rest images. 5/26/21 s/p cardiac cath that revealed severe 3 vessel CAD w/ISR. s/p ASHWIN->proxRCA.    Patient was seen in the outpatient office by Dr. Egan and now presents for elective surgery.     COVID vaccine: Moderna 3/18/21     Patient seen in same day holding area; Reports no changes to PMHx or medications since last seen by our team. Denies acute or current SOB, chest pain, palpitation, N/V/D, fever/chills, recent illness, or any other concerning symptoms.    66 y/o Cantonese speaking male, current smoker presents with PMH of HTN, HLD, DM, hepatitis B, PVD s/p PTA SFA and DEANNA, CAD s/p PCIs- RCA 2012 @ [place], PCI LAD and LCx 2016 @ [facility]. He presented to cardiologist Dr. Radha Leary with c/o dyspnea and mild chest tightness with exertion occurring on walking greater than 2 blocks or climbing greater than 1 flight of stairs. Symptoms relieved with rest. CCS III. Pt reports chest pain is located on substernal area, non-radiating lasting for a few minutes. He also reports b/l Leg claudication of the thighs with walking a couple of blocks X few months, worse with stairs and relieved with rest. Pt. denies any SOB, palpitation, N/V, LE edema, syncope, PND/orthopnea. Subsequently pt. underwent NST 3/22/21 revealed EF 55 %, moderate amount of ischemia in anterior region on the stress images which was/were reversible in the rest images. 5/26/21 s/p cardiac cath that revealed severe 3 vessel CAD w/ISR. s/p ASHWIN->proxRCA.    Pt presents today for OR.  No changes to current medical condition since last interaction.  No recent fevers, chills, cough, nausea, emesis, rashes.  Pt had both doses of COVID vaccine and card scanned into chart.  No allergies.  Medication list reviewed.  Pt took Plavix yesterday.  Pt took Metoprolol this AM.  Otherwise has been NPO since midnight.  Procedures, risks, expectations, recovery discussed with patient and consent obtained.    COVID vaccine: Moderna 3/18/21     Patient seen in same day holding area; Reports no changes to PMHx or medications since last seen by our team. Denies acute or current SOB, chest pain, palpitation, N/V/D, fever/chills, recent illness, or any other concerning symptoms.

## 2021-06-18 NOTE — H&P ADULT - NSICDXPASTMEDICALHX_GEN_ALL_CORE_FT
PAST MEDICAL HISTORY:  CAD (coronary artery disease)     Diabetes mellitus     Hepatitis B     History of BPH     Hyperlipidemia     Hypertension     PVD (peripheral vascular disease)

## 2021-06-18 NOTE — H&P ADULT - ASSESSMENT
65 year old male with PMH of HTN, HLD, DM, hepatitis B, PVD and CAD with 3 vessel CAD. He is s/p ASHWIN->proximal RCA with residual LAD ISR and proximal LCX stenosis. Dr. Egan reviewed the cardiac cath imaging and reports with the patient and discussed the case with Dr. Mcrae and Dr. Leary. Dr. Egna discussed the risks, benefits and alternatives to surgery. Risks include but not limited to death, heart attack, bleeding, stroke, kidney problems and infection. He quoted a 1% operative mortality and complication risk. He also discussed the various approaches, minimally invasive versus sternotomy. Dr. Egan feels the patient will benefit and is a candidate for a robotic assisted MIDCAB (LIMA->LAD) as part of hybrid procedure. All questions were addressed and patient agrees to proceed with surgery.     Plan:   PST   SDA 6/28  patient instructed to take toprol on morning of surgery  instructions provided re antibacterial showers and pt given 3 sponges  pt instructed on use of the incentive spirometer and demonstrated use to 1500cc  PCI of LCX to be performed 4-6 weeks post surgery   65 year old male with PMH of HTN, HLD, DM, hepatitis B, PVD and CAD with 3 vessel CAD. He is s/p ASHWIN->proximal RCA with residual LAD ISR and proximal LCX stenosis. Dr. Egan reviewed the cardiac cath imaging and reports with the patient and discussed the case with Dr. Mcrae and Dr. Leary. Dr. Egan discussed the risks, benefits and alternatives to surgery. Risks include but not limited to death, heart attack, bleeding, stroke, kidney problems and infection. He quoted a 1% operative mortality and complication risk. He also discussed the various approaches, minimally invasive versus sternotomy. Dr. Egan feels the patient will benefit and is a candidate for a robotic assisted MIDCAB (LIMA->LAD) as part of hybrid procedure. All questions were addressed and patient agrees to proceed with surgery.     Plan:   PST   SDA 6/28  patient instructed to take toprol on morning of surgery  instructions provided re antibacterial showers and pt given 3 sponges  pt instructed on use of the incentive spirometer and demonstrated use to 1500cc  PCI of LCX to be performed 4-6 weeks post surgery    Admit under Dr. Egan  via same day surgery. Consent signed, placed on chart.  Risks/benefits reviewed, patient understands and agrees. T&S ordered and blood products placed on hold for OR.  To 9E  post-op.

## 2021-06-27 ENCOUNTER — TRANSCRIPTION ENCOUNTER (OUTPATIENT)
Age: 66
End: 2021-06-27

## 2021-06-28 ENCOUNTER — INPATIENT (INPATIENT)
Facility: HOSPITAL | Age: 66
LOS: 2 days | Discharge: HOME HEALTH | DRG: 236 | End: 2021-07-01
Attending: THORACIC SURGERY (CARDIOTHORACIC VASCULAR SURGERY) | Admitting: THORACIC SURGERY (CARDIOTHORACIC VASCULAR SURGERY)
Payer: MEDICARE

## 2021-06-28 ENCOUNTER — APPOINTMENT (OUTPATIENT)
Dept: CARDIOTHORACIC SURGERY | Facility: HOSPITAL | Age: 66
End: 2021-06-28

## 2021-06-28 DIAGNOSIS — Z98.890 OTHER SPECIFIED POSTPROCEDURAL STATES: Chronic | ICD-10-CM

## 2021-06-28 LAB
ALBUMIN SERPL ELPH-MCNC: 3.3 G/DL — SIGNIFICANT CHANGE UP (ref 3.3–5)
ALBUMIN SERPL ELPH-MCNC: 3.5 G/DL — SIGNIFICANT CHANGE UP (ref 3.3–5)
ALP SERPL-CCNC: 49 U/L — SIGNIFICANT CHANGE UP (ref 40–120)
ALP SERPL-CCNC: 54 U/L — SIGNIFICANT CHANGE UP (ref 40–120)
ALT FLD-CCNC: 17 U/L — SIGNIFICANT CHANGE UP (ref 10–45)
ALT FLD-CCNC: 17 U/L — SIGNIFICANT CHANGE UP (ref 10–45)
ANION GAP SERPL CALC-SCNC: 8 MMOL/L — SIGNIFICANT CHANGE UP (ref 5–17)
ANION GAP SERPL CALC-SCNC: 9 MMOL/L — SIGNIFICANT CHANGE UP (ref 5–17)
APTT BLD: 28 SEC — SIGNIFICANT CHANGE UP (ref 27.5–35.5)
APTT BLD: 28.5 SEC — SIGNIFICANT CHANGE UP (ref 27.5–35.5)
AST SERPL-CCNC: 15 U/L — SIGNIFICANT CHANGE UP (ref 10–40)
AST SERPL-CCNC: 18 U/L — SIGNIFICANT CHANGE UP (ref 10–40)
BASOPHILS # BLD AUTO: 0.02 K/UL — SIGNIFICANT CHANGE UP (ref 0–0.2)
BASOPHILS NFR BLD AUTO: 0.2 % — SIGNIFICANT CHANGE UP (ref 0–2)
BILIRUB SERPL-MCNC: 0.3 MG/DL — SIGNIFICANT CHANGE UP (ref 0.2–1.2)
BILIRUB SERPL-MCNC: 0.4 MG/DL — SIGNIFICANT CHANGE UP (ref 0.2–1.2)
BLD GP AB SCN SERPL QL: NEGATIVE — SIGNIFICANT CHANGE UP
BUN SERPL-MCNC: 20 MG/DL — SIGNIFICANT CHANGE UP (ref 7–23)
BUN SERPL-MCNC: 24 MG/DL — HIGH (ref 7–23)
CALCIUM SERPL-MCNC: 8.2 MG/DL — LOW (ref 8.4–10.5)
CALCIUM SERPL-MCNC: 8.3 MG/DL — LOW (ref 8.4–10.5)
CHLORIDE SERPL-SCNC: 106 MMOL/L — SIGNIFICANT CHANGE UP (ref 96–108)
CHLORIDE SERPL-SCNC: 111 MMOL/L — HIGH (ref 96–108)
CO2 SERPL-SCNC: 21 MMOL/L — LOW (ref 22–31)
CO2 SERPL-SCNC: 21 MMOL/L — LOW (ref 22–31)
CREAT SERPL-MCNC: 0.96 MG/DL — SIGNIFICANT CHANGE UP (ref 0.5–1.3)
CREAT SERPL-MCNC: 1 MG/DL — SIGNIFICANT CHANGE UP (ref 0.5–1.3)
EOSINOPHIL # BLD AUTO: 0.05 K/UL — SIGNIFICANT CHANGE UP (ref 0–0.5)
EOSINOPHIL NFR BLD AUTO: 0.5 % — SIGNIFICANT CHANGE UP (ref 0–6)
GAS PNL BLDA: SIGNIFICANT CHANGE UP
GLUCOSE BLDC GLUCOMTR-MCNC: 116 MG/DL — HIGH (ref 70–99)
GLUCOSE BLDC GLUCOMTR-MCNC: 119 MG/DL — HIGH (ref 70–99)
GLUCOSE BLDC GLUCOMTR-MCNC: 140 MG/DL — HIGH (ref 70–99)
GLUCOSE BLDC GLUCOMTR-MCNC: 161 MG/DL — HIGH (ref 70–99)
GLUCOSE BLDC GLUCOMTR-MCNC: 195 MG/DL — HIGH (ref 70–99)
GLUCOSE BLDC GLUCOMTR-MCNC: 92 MG/DL — SIGNIFICANT CHANGE UP (ref 70–99)
GLUCOSE BLDC GLUCOMTR-MCNC: 96 MG/DL — SIGNIFICANT CHANGE UP (ref 70–99)
GLUCOSE SERPL-MCNC: 123 MG/DL — HIGH (ref 70–99)
GLUCOSE SERPL-MCNC: 183 MG/DL — HIGH (ref 70–99)
HCT VFR BLD CALC: 35.7 % — LOW (ref 39–50)
HCT VFR BLD CALC: 38.9 % — LOW (ref 39–50)
HGB BLD-MCNC: 11.7 G/DL — LOW (ref 13–17)
HGB BLD-MCNC: 12.8 G/DL — LOW (ref 13–17)
IMM GRANULOCYTES NFR BLD AUTO: 1.2 % — SIGNIFICANT CHANGE UP (ref 0–1.5)
INR BLD: 1.02 — SIGNIFICANT CHANGE UP (ref 0.88–1.16)
INR BLD: 1.03 — SIGNIFICANT CHANGE UP (ref 0.88–1.16)
LYMPHOCYTES # BLD AUTO: 0.93 K/UL — LOW (ref 1–3.3)
LYMPHOCYTES # BLD AUTO: 9.6 % — LOW (ref 13–44)
MAGNESIUM SERPL-MCNC: 1.7 MG/DL — SIGNIFICANT CHANGE UP (ref 1.6–2.6)
MAGNESIUM SERPL-MCNC: 2.2 MG/DL — SIGNIFICANT CHANGE UP (ref 1.6–2.6)
MCHC RBC-ENTMCNC: 30.5 PG — SIGNIFICANT CHANGE UP (ref 27–34)
MCHC RBC-ENTMCNC: 30.9 PG — SIGNIFICANT CHANGE UP (ref 27–34)
MCHC RBC-ENTMCNC: 32.8 GM/DL — SIGNIFICANT CHANGE UP (ref 32–36)
MCHC RBC-ENTMCNC: 32.9 GM/DL — SIGNIFICANT CHANGE UP (ref 32–36)
MCV RBC AUTO: 92.8 FL — SIGNIFICANT CHANGE UP (ref 80–100)
MCV RBC AUTO: 94.2 FL — SIGNIFICANT CHANGE UP (ref 80–100)
MONOCYTES # BLD AUTO: 0.58 K/UL — SIGNIFICANT CHANGE UP (ref 0–0.9)
MONOCYTES NFR BLD AUTO: 6 % — SIGNIFICANT CHANGE UP (ref 2–14)
NEUTROPHILS # BLD AUTO: 8.01 K/UL — HIGH (ref 1.8–7.4)
NEUTROPHILS NFR BLD AUTO: 82.5 % — HIGH (ref 43–77)
NRBC # BLD: 0 /100 WBCS — SIGNIFICANT CHANGE UP (ref 0–0)
NRBC # BLD: 0 /100 WBCS — SIGNIFICANT CHANGE UP (ref 0–0)
PHOSPHATE SERPL-MCNC: 2.9 MG/DL — SIGNIFICANT CHANGE UP (ref 2.5–4.5)
PHOSPHATE SERPL-MCNC: 3.9 MG/DL — SIGNIFICANT CHANGE UP (ref 2.5–4.5)
PLATELET # BLD AUTO: 116 K/UL — LOW (ref 150–400)
PLATELET # BLD AUTO: 139 K/UL — LOW (ref 150–400)
POTASSIUM SERPL-MCNC: 3.9 MMOL/L — SIGNIFICANT CHANGE UP (ref 3.5–5.3)
POTASSIUM SERPL-MCNC: 4 MMOL/L — SIGNIFICANT CHANGE UP (ref 3.5–5.3)
POTASSIUM SERPL-SCNC: 3.9 MMOL/L — SIGNIFICANT CHANGE UP (ref 3.5–5.3)
POTASSIUM SERPL-SCNC: 4 MMOL/L — SIGNIFICANT CHANGE UP (ref 3.5–5.3)
PROT SERPL-MCNC: 5.4 G/DL — LOW (ref 6–8.3)
PROT SERPL-MCNC: 6 G/DL — SIGNIFICANT CHANGE UP (ref 6–8.3)
PROTHROM AB SERPL-ACNC: 12.2 SEC — SIGNIFICANT CHANGE UP (ref 10.6–13.6)
PROTHROM AB SERPL-ACNC: 12.3 SEC — SIGNIFICANT CHANGE UP (ref 10.6–13.6)
RBC # BLD: 3.79 M/UL — LOW (ref 4.2–5.8)
RBC # BLD: 4.19 M/UL — LOW (ref 4.2–5.8)
RBC # FLD: 13.1 % — SIGNIFICANT CHANGE UP (ref 10.3–14.5)
RBC # FLD: 13.3 % — SIGNIFICANT CHANGE UP (ref 10.3–14.5)
RH IG SCN BLD-IMP: POSITIVE — SIGNIFICANT CHANGE UP
SODIUM SERPL-SCNC: 135 MMOL/L — SIGNIFICANT CHANGE UP (ref 135–145)
SODIUM SERPL-SCNC: 141 MMOL/L — SIGNIFICANT CHANGE UP (ref 135–145)
WBC # BLD: 10.85 K/UL — HIGH (ref 3.8–10.5)
WBC # BLD: 9.71 K/UL — SIGNIFICANT CHANGE UP (ref 3.8–10.5)
WBC # FLD AUTO: 10.85 K/UL — HIGH (ref 3.8–10.5)
WBC # FLD AUTO: 9.71 K/UL — SIGNIFICANT CHANGE UP (ref 3.8–10.5)

## 2021-06-28 PROCEDURE — 33533 CABG ARTERIAL SINGLE: CPT

## 2021-06-28 PROCEDURE — 71045 X-RAY EXAM CHEST 1 VIEW: CPT | Mod: 26

## 2021-06-28 PROCEDURE — 99292 CRITICAL CARE ADDL 30 MIN: CPT

## 2021-06-28 PROCEDURE — 93971 EXTREMITY STUDY: CPT | Mod: 26

## 2021-06-28 PROCEDURE — 33533 CABG ARTERIAL SINGLE: CPT | Mod: AS

## 2021-06-28 PROCEDURE — 93010 ELECTROCARDIOGRAM REPORT: CPT

## 2021-06-28 PROCEDURE — 76998 US GUIDE INTRAOP: CPT | Mod: 26,59

## 2021-06-28 PROCEDURE — 76998 US GUIDE INTRAOP: CPT | Mod: 26,AS

## 2021-06-28 PROCEDURE — 99291 CRITICAL CARE FIRST HOUR: CPT

## 2021-06-28 RX ORDER — ASPIRIN/CALCIUM CARB/MAGNESIUM 324 MG
81 TABLET ORAL DAILY
Refills: 0 | Status: DISCONTINUED | OUTPATIENT
Start: 2021-06-28 | End: 2021-07-01

## 2021-06-28 RX ORDER — CHLORHEXIDINE GLUCONATE 213 G/1000ML
5 SOLUTION TOPICAL
Refills: 0 | Status: DISCONTINUED | OUTPATIENT
Start: 2021-06-28 | End: 2021-06-28

## 2021-06-28 RX ORDER — ALBUMIN HUMAN 25 %
250 VIAL (ML) INTRAVENOUS
Refills: 0 | Status: COMPLETED | OUTPATIENT
Start: 2021-06-28 | End: 2021-06-28

## 2021-06-28 RX ORDER — BUPIVACAINE 13.3 MG/ML
20 INJECTION, SUSPENSION, LIPOSOMAL INFILTRATION ONCE
Refills: 0 | Status: DISCONTINUED | OUTPATIENT
Start: 2021-06-28 | End: 2021-06-28

## 2021-06-28 RX ORDER — RIVAROXABAN 15 MG-20MG
1 KIT ORAL
Qty: 0 | Refills: 0 | DISCHARGE

## 2021-06-28 RX ORDER — ERGOCALCIFEROL 1.25 MG/1
1 CAPSULE ORAL
Qty: 0 | Refills: 0 | DISCHARGE

## 2021-06-28 RX ORDER — GABAPENTIN 400 MG/1
100 CAPSULE ORAL DAILY
Refills: 0 | Status: DISCONTINUED | OUTPATIENT
Start: 2021-06-28 | End: 2021-07-01

## 2021-06-28 RX ORDER — ACETAMINOPHEN 500 MG
1000 TABLET ORAL ONCE
Refills: 0 | Status: COMPLETED | OUTPATIENT
Start: 2021-06-28 | End: 2021-06-28

## 2021-06-28 RX ORDER — INSULIN HUMAN 100 [IU]/ML
1 INJECTION, SOLUTION SUBCUTANEOUS
Qty: 50 | Refills: 0 | Status: DISCONTINUED | OUTPATIENT
Start: 2021-06-28 | End: 2021-06-29

## 2021-06-28 RX ORDER — CALCIUM GLUCONATE 100 MG/ML
2 VIAL (ML) INTRAVENOUS ONCE
Refills: 0 | Status: COMPLETED | OUTPATIENT
Start: 2021-06-28 | End: 2021-06-28

## 2021-06-28 RX ORDER — ACETAMINOPHEN 500 MG
1000 TABLET ORAL ONCE
Refills: 0 | Status: DISCONTINUED | OUTPATIENT
Start: 2021-06-28 | End: 2021-06-28

## 2021-06-28 RX ORDER — TAMSULOSIN HYDROCHLORIDE 0.4 MG/1
1 CAPSULE ORAL
Qty: 0 | Refills: 0 | DISCHARGE

## 2021-06-28 RX ORDER — DEXTROSE 50 % IN WATER 50 %
50 SYRINGE (ML) INTRAVENOUS
Refills: 0 | Status: DISCONTINUED | OUTPATIENT
Start: 2021-06-28 | End: 2021-06-29

## 2021-06-28 RX ORDER — FENTANYL CITRATE 50 UG/ML
25 INJECTION INTRAVENOUS ONCE
Refills: 0 | Status: DISCONTINUED | OUTPATIENT
Start: 2021-06-28 | End: 2021-06-28

## 2021-06-28 RX ORDER — SODIUM CHLORIDE 9 MG/ML
500 INJECTION, SOLUTION INTRAVENOUS ONCE
Refills: 0 | Status: COMPLETED | OUTPATIENT
Start: 2021-06-28 | End: 2021-06-28

## 2021-06-28 RX ORDER — POLYETHYLENE GLYCOL 3350 17 G/17G
17 POWDER, FOR SOLUTION ORAL DAILY
Refills: 0 | Status: DISCONTINUED | OUTPATIENT
Start: 2021-06-28 | End: 2021-07-01

## 2021-06-28 RX ORDER — NICARDIPINE HYDROCHLORIDE 30 MG/1
5 CAPSULE, EXTENDED RELEASE ORAL
Qty: 40 | Refills: 0 | Status: DISCONTINUED | OUTPATIENT
Start: 2021-06-28 | End: 2021-06-29

## 2021-06-28 RX ORDER — ZOLPIDEM TARTRATE 10 MG/1
1 TABLET ORAL
Qty: 0 | Refills: 0 | DISCHARGE

## 2021-06-28 RX ORDER — ACETAMINOPHEN 500 MG
650 TABLET ORAL EVERY 6 HOURS
Refills: 0 | Status: DISCONTINUED | OUTPATIENT
Start: 2021-06-28 | End: 2021-07-01

## 2021-06-28 RX ORDER — POTASSIUM CHLORIDE 20 MEQ
20 PACKET (EA) ORAL ONCE
Refills: 0 | Status: COMPLETED | OUTPATIENT
Start: 2021-06-28 | End: 2021-06-28

## 2021-06-28 RX ORDER — ASPIRIN/CALCIUM CARB/MAGNESIUM 324 MG
81 TABLET ORAL DAILY
Refills: 0 | Status: DISCONTINUED | OUTPATIENT
Start: 2021-06-28 | End: 2021-06-28

## 2021-06-28 RX ORDER — CLOPIDOGREL BISULFATE 75 MG/1
75 TABLET, FILM COATED ORAL DAILY
Refills: 0 | Status: DISCONTINUED | OUTPATIENT
Start: 2021-06-28 | End: 2021-07-01

## 2021-06-28 RX ORDER — FENTANYL CITRATE 50 UG/ML
25 INJECTION INTRAVENOUS
Refills: 0 | Status: DISCONTINUED | OUTPATIENT
Start: 2021-06-28 | End: 2021-06-28

## 2021-06-28 RX ORDER — CEFAZOLIN SODIUM 1 G
2000 VIAL (EA) INJECTION EVERY 8 HOURS
Refills: 0 | Status: COMPLETED | OUTPATIENT
Start: 2021-06-28 | End: 2021-06-29

## 2021-06-28 RX ORDER — OXYCODONE HYDROCHLORIDE 5 MG/1
10 TABLET ORAL EVERY 6 HOURS
Refills: 0 | Status: DISCONTINUED | OUTPATIENT
Start: 2021-06-28 | End: 2021-07-01

## 2021-06-28 RX ORDER — SODIUM CHLORIDE 9 MG/ML
1000 INJECTION INTRAMUSCULAR; INTRAVENOUS; SUBCUTANEOUS
Refills: 0 | Status: DISCONTINUED | OUTPATIENT
Start: 2021-06-28 | End: 2021-06-29

## 2021-06-28 RX ORDER — DEXMEDETOMIDINE HYDROCHLORIDE IN 0.9% SODIUM CHLORIDE 4 UG/ML
0.2 INJECTION INTRAVENOUS
Qty: 400 | Refills: 0 | Status: DISCONTINUED | OUTPATIENT
Start: 2021-06-28 | End: 2021-06-28

## 2021-06-28 RX ORDER — PANTOPRAZOLE SODIUM 20 MG/1
40 TABLET, DELAYED RELEASE ORAL DAILY
Refills: 0 | Status: DISCONTINUED | OUTPATIENT
Start: 2021-06-28 | End: 2021-06-28

## 2021-06-28 RX ORDER — TAMSULOSIN HYDROCHLORIDE 0.4 MG/1
0.4 CAPSULE ORAL AT BEDTIME
Refills: 0 | Status: DISCONTINUED | OUTPATIENT
Start: 2021-06-28 | End: 2021-07-01

## 2021-06-28 RX ORDER — HEPARIN SODIUM 5000 [USP'U]/ML
5000 INJECTION INTRAVENOUS; SUBCUTANEOUS EVERY 8 HOURS
Refills: 0 | Status: DISCONTINUED | OUTPATIENT
Start: 2021-06-28 | End: 2021-07-01

## 2021-06-28 RX ORDER — OXYCODONE HYDROCHLORIDE 5 MG/1
5 TABLET ORAL EVERY 4 HOURS
Refills: 0 | Status: DISCONTINUED | OUTPATIENT
Start: 2021-06-28 | End: 2021-07-01

## 2021-06-28 RX ORDER — PIOGLITAZONE HYDROCHLORIDE 15 MG/1
1 TABLET ORAL
Qty: 0 | Refills: 0 | DISCHARGE

## 2021-06-28 RX ORDER — ACETAMINOPHEN 500 MG
1000 TABLET ORAL ONCE
Refills: 0 | Status: COMPLETED | OUTPATIENT
Start: 2021-06-28 | End: 2021-06-29

## 2021-06-28 RX ORDER — MAGNESIUM SULFATE 500 MG/ML
2 VIAL (ML) INJECTION ONCE
Refills: 0 | Status: COMPLETED | OUTPATIENT
Start: 2021-06-28 | End: 2021-06-28

## 2021-06-28 RX ORDER — DEXTROSE 50 % IN WATER 50 %
25 SYRINGE (ML) INTRAVENOUS
Refills: 0 | Status: DISCONTINUED | OUTPATIENT
Start: 2021-06-28 | End: 2021-06-29

## 2021-06-28 RX ORDER — ENTECAVIR 0.5 MG/1
1 TABLET ORAL DAILY
Refills: 0 | Status: DISCONTINUED | OUTPATIENT
Start: 2021-06-28 | End: 2021-07-01

## 2021-06-28 RX ORDER — CEFAZOLIN SODIUM 1 G
2000 VIAL (EA) INJECTION EVERY 8 HOURS
Refills: 0 | Status: DISCONTINUED | OUTPATIENT
Start: 2021-06-28 | End: 2021-06-28

## 2021-06-28 RX ORDER — PANTOPRAZOLE SODIUM 20 MG/1
40 TABLET, DELAYED RELEASE ORAL
Refills: 0 | Status: DISCONTINUED | OUTPATIENT
Start: 2021-06-28 | End: 2021-07-01

## 2021-06-28 RX ORDER — FENTANYL CITRATE 50 UG/ML
25 INJECTION INTRAVENOUS
Refills: 0 | Status: DISCONTINUED | OUTPATIENT
Start: 2021-06-28 | End: 2021-06-29

## 2021-06-28 RX ADMIN — CLOPIDOGREL BISULFATE 75 MILLIGRAM(S): 75 TABLET, FILM COATED ORAL at 21:53

## 2021-06-28 RX ADMIN — Medication 100 MILLIEQUIVALENT(S): at 12:41

## 2021-06-28 RX ADMIN — SODIUM CHLORIDE 500 MILLILITER(S): 9 INJECTION, SOLUTION INTRAVENOUS at 14:17

## 2021-06-28 RX ADMIN — Medication 125 MILLILITER(S): at 19:00

## 2021-06-28 RX ADMIN — HEPARIN SODIUM 5000 UNIT(S): 5000 INJECTION INTRAVENOUS; SUBCUTANEOUS at 21:50

## 2021-06-28 RX ADMIN — FENTANYL CITRATE 25 MICROGRAM(S): 50 INJECTION INTRAVENOUS at 13:00

## 2021-06-28 RX ADMIN — Medication 2000 MILLIGRAM(S): at 21:52

## 2021-06-28 RX ADMIN — Medication 400 MILLIGRAM(S): at 16:56

## 2021-06-28 RX ADMIN — TAMSULOSIN HYDROCHLORIDE 0.4 MILLIGRAM(S): 0.4 CAPSULE ORAL at 21:50

## 2021-06-28 RX ADMIN — FENTANYL CITRATE 25 MICROGRAM(S): 50 INJECTION INTRAVENOUS at 12:30

## 2021-06-28 RX ADMIN — Medication 200 GRAM(S): at 20:00

## 2021-06-28 RX ADMIN — Medication 2000 MILLIGRAM(S): at 15:58

## 2021-06-28 RX ADMIN — Medication 50 GRAM(S): at 12:49

## 2021-06-28 RX ADMIN — FENTANYL CITRATE 25 MICROGRAM(S): 50 INJECTION INTRAVENOUS at 12:45

## 2021-06-28 RX ADMIN — Medication 125 MILLILITER(S): at 20:00

## 2021-06-28 RX ADMIN — Medication 250 MILLILITER(S): at 23:14

## 2021-06-28 RX ADMIN — ENTECAVIR 1 MILLIGRAM(S): 0.5 TABLET ORAL at 21:55

## 2021-06-28 RX ADMIN — Medication 250 MILLILITER(S): at 21:53

## 2021-06-28 RX ADMIN — GABAPENTIN 100 MILLIGRAM(S): 400 CAPSULE ORAL at 21:50

## 2021-06-28 RX ADMIN — PANTOPRAZOLE SODIUM 40 MILLIGRAM(S): 20 TABLET, DELAYED RELEASE ORAL at 14:33

## 2021-06-28 RX ADMIN — SODIUM CHLORIDE 1000 MILLILITER(S): 9 INJECTION, SOLUTION INTRAVENOUS at 19:00

## 2021-06-28 RX ADMIN — FENTANYL CITRATE 25 MICROGRAM(S): 50 INJECTION INTRAVENOUS at 12:44

## 2021-06-28 NOTE — BRIEF OPERATIVE NOTE - COMMENTS
Damion Garcia PA-C was the first assistant for this case including but not limited to robotic port placement, robotic instrument exchange, LIMA to LAD anastomosis, closure.    I was present for this procedure and participated as first assistant as described by the PA above, unless otherwise noted below.

## 2021-06-28 NOTE — BRIEF OPERATIVE NOTE - NSICDXBRIEFPROCEDURE_GEN_ALL_CORE_FT
PROCEDURES:  Minimally invasive direct coronary artery bypass (MIDCAB) with transesophageal echocardiography (YONY) 28-Jun-2021 11:37:39 Robotic MIDCAB (LIMA to LAD) [place] Yous[place]

## 2021-06-29 LAB
ALBUMIN SERPL ELPH-MCNC: 3.6 G/DL — SIGNIFICANT CHANGE UP (ref 3.3–5)
ALP SERPL-CCNC: 38 U/L — LOW (ref 40–120)
ALT FLD-CCNC: 14 U/L — SIGNIFICANT CHANGE UP (ref 10–45)
ANION GAP SERPL CALC-SCNC: 11 MMOL/L — SIGNIFICANT CHANGE UP (ref 5–17)
ANION GAP SERPL CALC-SCNC: 8 MMOL/L — SIGNIFICANT CHANGE UP (ref 5–17)
APTT BLD: 30.2 SEC — SIGNIFICANT CHANGE UP (ref 27.5–35.5)
AST SERPL-CCNC: 16 U/L — SIGNIFICANT CHANGE UP (ref 10–40)
BILIRUB SERPL-MCNC: 0.4 MG/DL — SIGNIFICANT CHANGE UP (ref 0.2–1.2)
BUN SERPL-MCNC: 17 MG/DL — SIGNIFICANT CHANGE UP (ref 7–23)
BUN SERPL-MCNC: 20 MG/DL — SIGNIFICANT CHANGE UP (ref 7–23)
CALCIUM SERPL-MCNC: 8 MG/DL — LOW (ref 8.4–10.5)
CALCIUM SERPL-MCNC: 8.5 MG/DL — SIGNIFICANT CHANGE UP (ref 8.4–10.5)
CHLORIDE SERPL-SCNC: 106 MMOL/L — SIGNIFICANT CHANGE UP (ref 96–108)
CHLORIDE SERPL-SCNC: 109 MMOL/L — HIGH (ref 96–108)
CO2 SERPL-SCNC: 22 MMOL/L — SIGNIFICANT CHANGE UP (ref 22–31)
CO2 SERPL-SCNC: 23 MMOL/L — SIGNIFICANT CHANGE UP (ref 22–31)
CREAT SERPL-MCNC: 0.94 MG/DL — SIGNIFICANT CHANGE UP (ref 0.5–1.3)
CREAT SERPL-MCNC: 1.04 MG/DL — SIGNIFICANT CHANGE UP (ref 0.5–1.3)
GAS PNL BLDA: SIGNIFICANT CHANGE UP
GLUCOSE BLDC GLUCOMTR-MCNC: 113 MG/DL — HIGH (ref 70–99)
GLUCOSE BLDC GLUCOMTR-MCNC: 140 MG/DL — HIGH (ref 70–99)
GLUCOSE BLDC GLUCOMTR-MCNC: 217 MG/DL — HIGH (ref 70–99)
GLUCOSE BLDC GLUCOMTR-MCNC: 256 MG/DL — HIGH (ref 70–99)
GLUCOSE BLDC GLUCOMTR-MCNC: 258 MG/DL — HIGH (ref 70–99)
GLUCOSE BLDC GLUCOMTR-MCNC: 77 MG/DL — SIGNIFICANT CHANGE UP (ref 70–99)
GLUCOSE SERPL-MCNC: 174 MG/DL — HIGH (ref 70–99)
GLUCOSE SERPL-MCNC: 82 MG/DL — SIGNIFICANT CHANGE UP (ref 70–99)
HCT VFR BLD CALC: 34.5 % — LOW (ref 39–50)
HCT VFR BLD CALC: 38.2 % — LOW (ref 39–50)
HGB BLD-MCNC: 11.4 G/DL — LOW (ref 13–17)
HGB BLD-MCNC: 12.5 G/DL — LOW (ref 13–17)
INR BLD: 1.09 — SIGNIFICANT CHANGE UP (ref 0.88–1.16)
MAGNESIUM SERPL-MCNC: 2.3 MG/DL — SIGNIFICANT CHANGE UP (ref 1.6–2.6)
MAGNESIUM SERPL-MCNC: 2.3 MG/DL — SIGNIFICANT CHANGE UP (ref 1.6–2.6)
MCHC RBC-ENTMCNC: 31.1 PG — SIGNIFICANT CHANGE UP (ref 27–34)
MCHC RBC-ENTMCNC: 31.3 PG — SIGNIFICANT CHANGE UP (ref 27–34)
MCHC RBC-ENTMCNC: 32.7 GM/DL — SIGNIFICANT CHANGE UP (ref 32–36)
MCHC RBC-ENTMCNC: 33 GM/DL — SIGNIFICANT CHANGE UP (ref 32–36)
MCV RBC AUTO: 94 FL — SIGNIFICANT CHANGE UP (ref 80–100)
MCV RBC AUTO: 95.5 FL — SIGNIFICANT CHANGE UP (ref 80–100)
NRBC # BLD: 0 /100 WBCS — SIGNIFICANT CHANGE UP (ref 0–0)
NRBC # BLD: 0 /100 WBCS — SIGNIFICANT CHANGE UP (ref 0–0)
PHOSPHATE SERPL-MCNC: 3 MG/DL — SIGNIFICANT CHANGE UP (ref 2.5–4.5)
PLATELET # BLD AUTO: 124 K/UL — LOW (ref 150–400)
PLATELET # BLD AUTO: 142 K/UL — LOW (ref 150–400)
POTASSIUM SERPL-MCNC: 3.8 MMOL/L — SIGNIFICANT CHANGE UP (ref 3.5–5.3)
POTASSIUM SERPL-MCNC: 4.1 MMOL/L — SIGNIFICANT CHANGE UP (ref 3.5–5.3)
POTASSIUM SERPL-SCNC: 3.8 MMOL/L — SIGNIFICANT CHANGE UP (ref 3.5–5.3)
POTASSIUM SERPL-SCNC: 4.1 MMOL/L — SIGNIFICANT CHANGE UP (ref 3.5–5.3)
PROT SERPL-MCNC: 5.7 G/DL — LOW (ref 6–8.3)
PROTHROM AB SERPL-ACNC: 13 SEC — SIGNIFICANT CHANGE UP (ref 10.6–13.6)
RBC # BLD: 3.67 M/UL — LOW (ref 4.2–5.8)
RBC # BLD: 4 M/UL — LOW (ref 4.2–5.8)
RBC # FLD: 13 % — SIGNIFICANT CHANGE UP (ref 10.3–14.5)
RBC # FLD: 13.2 % — SIGNIFICANT CHANGE UP (ref 10.3–14.5)
SODIUM SERPL-SCNC: 139 MMOL/L — SIGNIFICANT CHANGE UP (ref 135–145)
SODIUM SERPL-SCNC: 140 MMOL/L — SIGNIFICANT CHANGE UP (ref 135–145)
WBC # BLD: 8.56 K/UL — SIGNIFICANT CHANGE UP (ref 3.8–10.5)
WBC # BLD: 9.51 K/UL — SIGNIFICANT CHANGE UP (ref 3.8–10.5)
WBC # FLD AUTO: 8.56 K/UL — SIGNIFICANT CHANGE UP (ref 3.8–10.5)
WBC # FLD AUTO: 9.51 K/UL — SIGNIFICANT CHANGE UP (ref 3.8–10.5)

## 2021-06-29 PROCEDURE — 71045 X-RAY EXAM CHEST 1 VIEW: CPT | Mod: 26,76

## 2021-06-29 PROCEDURE — 99291 CRITICAL CARE FIRST HOUR: CPT

## 2021-06-29 RX ORDER — SODIUM CHLORIDE 9 MG/ML
1000 INJECTION, SOLUTION INTRAVENOUS
Refills: 0 | Status: DISCONTINUED | OUTPATIENT
Start: 2021-06-29 | End: 2021-07-01

## 2021-06-29 RX ORDER — POTASSIUM CHLORIDE 20 MEQ
20 PACKET (EA) ORAL ONCE
Refills: 0 | Status: COMPLETED | OUTPATIENT
Start: 2021-06-29 | End: 2021-06-29

## 2021-06-29 RX ORDER — DEXTROSE 50 % IN WATER 50 %
25 SYRINGE (ML) INTRAVENOUS ONCE
Refills: 0 | Status: DISCONTINUED | OUTPATIENT
Start: 2021-06-29 | End: 2021-07-01

## 2021-06-29 RX ORDER — FUROSEMIDE 40 MG
20 TABLET ORAL ONCE
Refills: 0 | Status: COMPLETED | OUTPATIENT
Start: 2021-06-29 | End: 2021-06-29

## 2021-06-29 RX ORDER — IPRATROPIUM/ALBUTEROL SULFATE 18-103MCG
3 AEROSOL WITH ADAPTER (GRAM) INHALATION ONCE
Refills: 0 | Status: COMPLETED | OUTPATIENT
Start: 2021-06-29 | End: 2021-06-29

## 2021-06-29 RX ORDER — INSULIN LISPRO 100/ML
VIAL (ML) SUBCUTANEOUS
Refills: 0 | Status: DISCONTINUED | OUTPATIENT
Start: 2021-06-29 | End: 2021-07-01

## 2021-06-29 RX ORDER — GLUCAGON INJECTION, SOLUTION 0.5 MG/.1ML
1 INJECTION, SOLUTION SUBCUTANEOUS ONCE
Refills: 0 | Status: DISCONTINUED | OUTPATIENT
Start: 2021-06-29 | End: 2021-07-01

## 2021-06-29 RX ORDER — DEXTROSE 50 % IN WATER 50 %
12.5 SYRINGE (ML) INTRAVENOUS ONCE
Refills: 0 | Status: DISCONTINUED | OUTPATIENT
Start: 2021-06-29 | End: 2021-07-01

## 2021-06-29 RX ORDER — ACETYLCYSTEINE 200 MG/ML
4 VIAL (ML) MISCELLANEOUS ONCE
Refills: 0 | Status: COMPLETED | OUTPATIENT
Start: 2021-06-29 | End: 2021-06-29

## 2021-06-29 RX ORDER — SODIUM CHLORIDE 9 MG/ML
3 INJECTION INTRAMUSCULAR; INTRAVENOUS; SUBCUTANEOUS EVERY 8 HOURS
Refills: 0 | Status: DISCONTINUED | OUTPATIENT
Start: 2021-06-29 | End: 2021-07-01

## 2021-06-29 RX ORDER — METOPROLOL TARTRATE 50 MG
12.5 TABLET ORAL ONCE
Refills: 0 | Status: COMPLETED | OUTPATIENT
Start: 2021-06-29 | End: 2021-06-29

## 2021-06-29 RX ORDER — INSULIN LISPRO 100/ML
3 VIAL (ML) SUBCUTANEOUS
Refills: 0 | Status: DISCONTINUED | OUTPATIENT
Start: 2021-06-29 | End: 2021-07-01

## 2021-06-29 RX ORDER — DEXMEDETOMIDINE HYDROCHLORIDE IN 0.9% SODIUM CHLORIDE 4 UG/ML
0.5 INJECTION INTRAVENOUS
Qty: 400 | Refills: 0 | Status: DISCONTINUED | OUTPATIENT
Start: 2021-06-29 | End: 2021-06-29

## 2021-06-29 RX ORDER — INSULIN GLARGINE 100 [IU]/ML
10 INJECTION, SOLUTION SUBCUTANEOUS AT BEDTIME
Refills: 0 | Status: DISCONTINUED | OUTPATIENT
Start: 2021-06-29 | End: 2021-07-01

## 2021-06-29 RX ORDER — METOPROLOL TARTRATE 50 MG
25 TABLET ORAL EVERY 12 HOURS
Refills: 0 | Status: DISCONTINUED | OUTPATIENT
Start: 2021-06-29 | End: 2021-06-30

## 2021-06-29 RX ORDER — HUMAN INSULIN 100 [IU]/ML
5 INJECTION, SUSPENSION SUBCUTANEOUS ONCE
Refills: 0 | Status: COMPLETED | OUTPATIENT
Start: 2021-06-29 | End: 2021-06-29

## 2021-06-29 RX ORDER — ALBUMIN HUMAN 25 %
250 VIAL (ML) INTRAVENOUS ONCE
Refills: 0 | Status: COMPLETED | OUTPATIENT
Start: 2021-06-29 | End: 2021-06-29

## 2021-06-29 RX ORDER — METOPROLOL TARTRATE 50 MG
12.5 TABLET ORAL EVERY 12 HOURS
Refills: 0 | Status: DISCONTINUED | OUTPATIENT
Start: 2021-06-29 | End: 2021-06-29

## 2021-06-29 RX ORDER — DEXTROSE 50 % IN WATER 50 %
15 SYRINGE (ML) INTRAVENOUS ONCE
Refills: 0 | Status: DISCONTINUED | OUTPATIENT
Start: 2021-06-29 | End: 2021-07-01

## 2021-06-29 RX ADMIN — Medication 4: at 20:36

## 2021-06-29 RX ADMIN — SODIUM CHLORIDE 3 MILLILITER(S): 9 INJECTION INTRAMUSCULAR; INTRAVENOUS; SUBCUTANEOUS at 20:37

## 2021-06-29 RX ADMIN — HEPARIN SODIUM 5000 UNIT(S): 5000 INJECTION INTRAVENOUS; SUBCUTANEOUS at 06:12

## 2021-06-29 RX ADMIN — OXYCODONE HYDROCHLORIDE 5 MILLIGRAM(S): 5 TABLET ORAL at 15:24

## 2021-06-29 RX ADMIN — TAMSULOSIN HYDROCHLORIDE 0.4 MILLIGRAM(S): 0.4 CAPSULE ORAL at 20:36

## 2021-06-29 RX ADMIN — POLYETHYLENE GLYCOL 3350 17 GRAM(S): 17 POWDER, FOR SOLUTION ORAL at 11:03

## 2021-06-29 RX ADMIN — GABAPENTIN 100 MILLIGRAM(S): 400 CAPSULE ORAL at 11:04

## 2021-06-29 RX ADMIN — SODIUM CHLORIDE 3 MILLILITER(S): 9 INJECTION INTRAMUSCULAR; INTRAVENOUS; SUBCUTANEOUS at 13:25

## 2021-06-29 RX ADMIN — Medication 3 UNIT(S): at 17:30

## 2021-06-29 RX ADMIN — OXYCODONE HYDROCHLORIDE 5 MILLIGRAM(S): 5 TABLET ORAL at 15:59

## 2021-06-29 RX ADMIN — Medication 125 MILLILITER(S): at 14:00

## 2021-06-29 RX ADMIN — Medication 20 MILLIGRAM(S): at 04:13

## 2021-06-29 RX ADMIN — Medication 4 MILLILITER(S): at 04:54

## 2021-06-29 RX ADMIN — Medication 3 UNIT(S): at 06:59

## 2021-06-29 RX ADMIN — HEPARIN SODIUM 5000 UNIT(S): 5000 INJECTION INTRAVENOUS; SUBCUTANEOUS at 20:37

## 2021-06-29 RX ADMIN — Medication 25 MILLIGRAM(S): at 17:31

## 2021-06-29 RX ADMIN — Medication 3 MILLILITER(S): at 04:54

## 2021-06-29 RX ADMIN — Medication 3 UNIT(S): at 11:04

## 2021-06-29 RX ADMIN — OXYCODONE HYDROCHLORIDE 10 MILLIGRAM(S): 5 TABLET ORAL at 12:48

## 2021-06-29 RX ADMIN — ENTECAVIR 1 MILLIGRAM(S): 0.5 TABLET ORAL at 11:05

## 2021-06-29 RX ADMIN — INSULIN GLARGINE 10 UNIT(S): 100 INJECTION, SOLUTION SUBCUTANEOUS at 20:37

## 2021-06-29 RX ADMIN — Medication 2000 MILLIGRAM(S): at 06:11

## 2021-06-29 RX ADMIN — PANTOPRAZOLE SODIUM 40 MILLIGRAM(S): 20 TABLET, DELAYED RELEASE ORAL at 06:11

## 2021-06-29 RX ADMIN — CLOPIDOGREL BISULFATE 75 MILLIGRAM(S): 75 TABLET, FILM COATED ORAL at 11:04

## 2021-06-29 RX ADMIN — OXYCODONE HYDROCHLORIDE 10 MILLIGRAM(S): 5 TABLET ORAL at 11:33

## 2021-06-29 RX ADMIN — OXYCODONE HYDROCHLORIDE 5 MILLIGRAM(S): 5 TABLET ORAL at 01:45

## 2021-06-29 RX ADMIN — OXYCODONE HYDROCHLORIDE 10 MILLIGRAM(S): 5 TABLET ORAL at 20:37

## 2021-06-29 RX ADMIN — Medication 2000 MILLIGRAM(S): at 20:36

## 2021-06-29 RX ADMIN — OXYCODONE HYDROCHLORIDE 5 MILLIGRAM(S): 5 TABLET ORAL at 10:04

## 2021-06-29 RX ADMIN — Medication 81 MILLIGRAM(S): at 11:03

## 2021-06-29 RX ADMIN — Medication 1000 MILLIGRAM(S): at 04:30

## 2021-06-29 RX ADMIN — Medication 6: at 11:04

## 2021-06-29 RX ADMIN — Medication 400 MILLIGRAM(S): at 04:14

## 2021-06-29 RX ADMIN — Medication 12.5 MILLIGRAM(S): at 13:31

## 2021-06-29 RX ADMIN — Medication 12.5 MILLIGRAM(S): at 11:54

## 2021-06-29 RX ADMIN — Medication 100 MILLIEQUIVALENT(S): at 03:05

## 2021-06-29 RX ADMIN — OXYCODONE HYDROCHLORIDE 10 MILLIGRAM(S): 5 TABLET ORAL at 21:30

## 2021-06-29 RX ADMIN — Medication 2000 MILLIGRAM(S): at 13:25

## 2021-06-29 RX ADMIN — HUMAN INSULIN 5 UNIT(S): 100 INJECTION, SUSPENSION SUBCUTANEOUS at 05:16

## 2021-06-29 RX ADMIN — OXYCODONE HYDROCHLORIDE 5 MILLIGRAM(S): 5 TABLET ORAL at 02:31

## 2021-06-29 RX ADMIN — HEPARIN SODIUM 5000 UNIT(S): 5000 INJECTION INTRAVENOUS; SUBCUTANEOUS at 13:25

## 2021-06-29 RX ADMIN — OXYCODONE HYDROCHLORIDE 5 MILLIGRAM(S): 5 TABLET ORAL at 09:22

## 2021-06-29 RX ADMIN — Medication 6: at 17:31

## 2021-06-29 NOTE — PHYSICAL THERAPY INITIAL EVALUATION ADULT - GENERAL OBSERVATIONS, REHAB EVAL
patient received seated out of bed with no acute distress.  +EKG +chest tube to wall suction +garry to wall suction +NC 3L/min +SCDs

## 2021-06-30 LAB
ANION GAP SERPL CALC-SCNC: 10 MMOL/L — SIGNIFICANT CHANGE UP (ref 5–17)
BASOPHILS # BLD AUTO: 0.03 K/UL — SIGNIFICANT CHANGE UP (ref 0–0.2)
BASOPHILS NFR BLD AUTO: 0.3 % — SIGNIFICANT CHANGE UP (ref 0–2)
BUN SERPL-MCNC: 24 MG/DL — HIGH (ref 7–23)
CALCIUM SERPL-MCNC: 8.8 MG/DL — SIGNIFICANT CHANGE UP (ref 8.4–10.5)
CHLORIDE SERPL-SCNC: 105 MMOL/L — SIGNIFICANT CHANGE UP (ref 96–108)
CO2 SERPL-SCNC: 23 MMOL/L — SIGNIFICANT CHANGE UP (ref 22–31)
CREAT SERPL-MCNC: 1.23 MG/DL — SIGNIFICANT CHANGE UP (ref 0.5–1.3)
EOSINOPHIL # BLD AUTO: 0.04 K/UL — SIGNIFICANT CHANGE UP (ref 0–0.5)
EOSINOPHIL NFR BLD AUTO: 0.4 % — SIGNIFICANT CHANGE UP (ref 0–6)
GLUCOSE BLDC GLUCOMTR-MCNC: 192 MG/DL — HIGH (ref 70–99)
GLUCOSE BLDC GLUCOMTR-MCNC: 228 MG/DL — HIGH (ref 70–99)
GLUCOSE BLDC GLUCOMTR-MCNC: 248 MG/DL — HIGH (ref 70–99)
GLUCOSE BLDC GLUCOMTR-MCNC: 343 MG/DL — HIGH (ref 70–99)
GLUCOSE SERPL-MCNC: 199 MG/DL — HIGH (ref 70–99)
HCT VFR BLD CALC: 41.4 % — SIGNIFICANT CHANGE UP (ref 39–50)
HGB BLD-MCNC: 13.2 G/DL — SIGNIFICANT CHANGE UP (ref 13–17)
IMM GRANULOCYTES NFR BLD AUTO: 0.6 % — SIGNIFICANT CHANGE UP (ref 0–1.5)
LYMPHOCYTES # BLD AUTO: 1.23 K/UL — SIGNIFICANT CHANGE UP (ref 1–3.3)
LYMPHOCYTES # BLD AUTO: 12.8 % — LOW (ref 13–44)
MCHC RBC-ENTMCNC: 30.9 PG — SIGNIFICANT CHANGE UP (ref 27–34)
MCHC RBC-ENTMCNC: 31.9 GM/DL — LOW (ref 32–36)
MCV RBC AUTO: 97 FL — SIGNIFICANT CHANGE UP (ref 80–100)
MONOCYTES # BLD AUTO: 1.17 K/UL — HIGH (ref 0–0.9)
MONOCYTES NFR BLD AUTO: 12.1 % — SIGNIFICANT CHANGE UP (ref 2–14)
NEUTROPHILS # BLD AUTO: 7.11 K/UL — SIGNIFICANT CHANGE UP (ref 1.8–7.4)
NEUTROPHILS NFR BLD AUTO: 73.8 % — SIGNIFICANT CHANGE UP (ref 43–77)
NRBC # BLD: 0 /100 WBCS — SIGNIFICANT CHANGE UP (ref 0–0)
PLATELET # BLD AUTO: 149 K/UL — LOW (ref 150–400)
POTASSIUM SERPL-MCNC: 4.4 MMOL/L — SIGNIFICANT CHANGE UP (ref 3.5–5.3)
POTASSIUM SERPL-SCNC: 4.4 MMOL/L — SIGNIFICANT CHANGE UP (ref 3.5–5.3)
RBC # BLD: 4.27 M/UL — SIGNIFICANT CHANGE UP (ref 4.2–5.8)
RBC # FLD: 13.5 % — SIGNIFICANT CHANGE UP (ref 10.3–14.5)
SODIUM SERPL-SCNC: 138 MMOL/L — SIGNIFICANT CHANGE UP (ref 135–145)
WBC # BLD: 9.64 K/UL — SIGNIFICANT CHANGE UP (ref 3.8–10.5)
WBC # FLD AUTO: 9.64 K/UL — SIGNIFICANT CHANGE UP (ref 3.8–10.5)

## 2021-06-30 PROCEDURE — 71045 X-RAY EXAM CHEST 1 VIEW: CPT | Mod: 26

## 2021-06-30 PROCEDURE — 99221 1ST HOSP IP/OBS SF/LOW 40: CPT

## 2021-06-30 PROCEDURE — 71045 X-RAY EXAM CHEST 1 VIEW: CPT | Mod: 26,77

## 2021-06-30 RX ORDER — METOPROLOL TARTRATE 50 MG
25 TABLET ORAL EVERY 8 HOURS
Refills: 0 | Status: DISCONTINUED | OUTPATIENT
Start: 2021-06-30 | End: 2021-06-30

## 2021-06-30 RX ORDER — ALBUMIN HUMAN 25 %
250 VIAL (ML) INTRAVENOUS ONCE
Refills: 0 | Status: COMPLETED | OUTPATIENT
Start: 2021-06-30 | End: 2021-06-30

## 2021-06-30 RX ORDER — METOPROLOL TARTRATE 50 MG
25 TABLET ORAL EVERY 6 HOURS
Refills: 0 | Status: DISCONTINUED | OUTPATIENT
Start: 2021-06-30 | End: 2021-07-01

## 2021-06-30 RX ORDER — ATORVASTATIN CALCIUM 80 MG/1
40 TABLET, FILM COATED ORAL AT BEDTIME
Refills: 0 | Status: DISCONTINUED | OUTPATIENT
Start: 2021-06-30 | End: 2021-07-01

## 2021-06-30 RX ADMIN — PANTOPRAZOLE SODIUM 40 MILLIGRAM(S): 20 TABLET, DELAYED RELEASE ORAL at 05:01

## 2021-06-30 RX ADMIN — Medication 25 MILLIGRAM(S): at 05:01

## 2021-06-30 RX ADMIN — Medication 25 MILLIGRAM(S): at 17:48

## 2021-06-30 RX ADMIN — Medication 4: at 17:48

## 2021-06-30 RX ADMIN — Medication 81 MILLIGRAM(S): at 11:32

## 2021-06-30 RX ADMIN — TAMSULOSIN HYDROCHLORIDE 0.4 MILLIGRAM(S): 0.4 CAPSULE ORAL at 21:54

## 2021-06-30 RX ADMIN — Medication 3 UNIT(S): at 17:48

## 2021-06-30 RX ADMIN — HEPARIN SODIUM 5000 UNIT(S): 5000 INJECTION INTRAVENOUS; SUBCUTANEOUS at 05:01

## 2021-06-30 RX ADMIN — ENTECAVIR 1 MILLIGRAM(S): 0.5 TABLET ORAL at 11:33

## 2021-06-30 RX ADMIN — Medication 125 MILLILITER(S): at 08:25

## 2021-06-30 RX ADMIN — Medication 3 UNIT(S): at 12:11

## 2021-06-30 RX ADMIN — CLOPIDOGREL BISULFATE 75 MILLIGRAM(S): 75 TABLET, FILM COATED ORAL at 11:32

## 2021-06-30 RX ADMIN — GABAPENTIN 100 MILLIGRAM(S): 400 CAPSULE ORAL at 11:32

## 2021-06-30 RX ADMIN — SODIUM CHLORIDE 3 MILLILITER(S): 9 INJECTION INTRAMUSCULAR; INTRAVENOUS; SUBCUTANEOUS at 05:01

## 2021-06-30 RX ADMIN — HEPARIN SODIUM 5000 UNIT(S): 5000 INJECTION INTRAVENOUS; SUBCUTANEOUS at 21:53

## 2021-06-30 RX ADMIN — ATORVASTATIN CALCIUM 40 MILLIGRAM(S): 80 TABLET, FILM COATED ORAL at 21:53

## 2021-06-30 RX ADMIN — Medication 3 UNIT(S): at 07:17

## 2021-06-30 RX ADMIN — OXYCODONE HYDROCHLORIDE 5 MILLIGRAM(S): 5 TABLET ORAL at 02:00

## 2021-06-30 RX ADMIN — OXYCODONE HYDROCHLORIDE 5 MILLIGRAM(S): 5 TABLET ORAL at 01:15

## 2021-06-30 RX ADMIN — HEPARIN SODIUM 5000 UNIT(S): 5000 INJECTION INTRAVENOUS; SUBCUTANEOUS at 14:49

## 2021-06-30 RX ADMIN — Medication 2: at 07:17

## 2021-06-30 RX ADMIN — POLYETHYLENE GLYCOL 3350 17 GRAM(S): 17 POWDER, FOR SOLUTION ORAL at 11:33

## 2021-06-30 RX ADMIN — INSULIN GLARGINE 10 UNIT(S): 100 INJECTION, SOLUTION SUBCUTANEOUS at 21:54

## 2021-06-30 RX ADMIN — Medication 25 MILLIGRAM(S): at 11:32

## 2021-06-30 RX ADMIN — SODIUM CHLORIDE 3 MILLILITER(S): 9 INJECTION INTRAMUSCULAR; INTRAVENOUS; SUBCUTANEOUS at 14:49

## 2021-06-30 RX ADMIN — SODIUM CHLORIDE 3 MILLILITER(S): 9 INJECTION INTRAMUSCULAR; INTRAVENOUS; SUBCUTANEOUS at 21:29

## 2021-06-30 RX ADMIN — Medication 8: at 12:11

## 2021-06-30 RX ADMIN — Medication 4: at 21:54

## 2021-06-30 NOTE — CHART NOTE - NSCHARTNOTEFT_GEN_A_CORE
CT Removal:    Pt seen and examined at bedside.  Case discussed with Dr. Egan. Minimal output from ELI. No air leak appreciated.  Per Dr. Egan request, ELI removed and tie down secured without incident.  Occlusive DSD placed.  CXR no obvious PTX noted.  Pt tolerated procedure well.

## 2021-06-30 NOTE — CONSULT NOTE ADULT - SUBJECTIVE AND OBJECTIVE BOX
Preventive Cardiology Consultation Note    CHIEF COMPLAINT: s/p CABG requiring cardiovascular prevention optimization and education    HISTORY OF PRESENT ILLNESS: 67 y/o Cantonese speaking male, current smoker, presents with PMH of HTN, HLD, DM, hepatitis B, PVD s/p PTA SFA and DEANNA, CAD s/p PCIs- RCA 2012 @ [facility], PCI LAD and LCx 2016 @ [facility]. He presented to cardiologist Dr. Radha Leary with c/o dyspnea and chest tightness with exertion when walking more than 2 blocks or climbing 1 flight of stairs. Symptoms relieved with rest. He also reports b/l Leg claudication of the thighs when walking a couple of blocks X few months, worse with stairs and relieved with rest. NST 3/22/21 revealed EF 55 %, moderate amount of ischemia in anterior region on the stress images which was/were reversible in the rest images. 5/26/21 s/p cardiac cath that revealed severe 3 vessel CAD w/ISR. s/p ASHWIN->proxRCA. Patient is now s/p MIDCAB, MIN-LAD 6/28/21.    Review of systems otherwise negative.     Lifestyle History:  Mediterranean Diet Score (9 question survey) was 5.   (8-9: optimal, 6-7: near-optimal, 4-5: suboptimal, 0-3: markedly suboptimal)  Exercise: Patient reports exercising at a moderate level for <30 minutes per week.   Smoking: Current smoker   Stress: Patient denies any stress.     PAST MEDICAL & SURGICAL HISTORY:  Hypertension    Hyperlipidemia    Hepatitis B    PVD (peripheral vascular disease)    Diabetes mellitus    CAD (coronary artery disease)    History of BPH    H/O eye surgery      FAMILY HISTORY:   Patient denies any first degree family history of premature CAD or CVA.     Allergies:   No Known Allergies      HOME MEDICATIONS:   · 	Plavix 75 mg oral tablet: Last Dose Taken:  , 1 tab(s) orally once a day   · 	Actos 15 mg oral tablet: 1 tab(s) orally once a day  · 	tamsulosin 0.4 mg oral capsule: Last Dose Taken:  , 1 cap(s) orally once a day  · 	glipiZIDE 10 mg oral tablet: Last Dose Taken:  , 1 tab(s) orally once a day  · 	Vascepa 1 g oral capsule: Last Dose Taken:  , 2 cap(s) orally 2 times a day  · 	Neurontin 100 mg oral capsule: Last Dose Taken:  , orally once a day  · 	aspirin 81 mg oral tablet: Last Dose Taken:  , orally once a day  · 	Ranexa 1000 mg oral tablet, extended release: Last Dose Taken:  , 1 tab(s) orally 2 times a day  · 	Toprol- mg oral tablet, extended release: Last Dose Taken:  , 1 tab(s) orally once a day  · 	Xultophy 100 units-3.6 mg/mL subcutaneous solution: Last Dose Taken:  , unit(s) subcutaneous  · 	Xigduo XR 5 mg-500 mg oral tablet, extended release: Last Dose Taken:  , 1 tab(s) orally once a day (in the morning)  · 	Baraclude 1 mg oral tablet: Last Dose Taken:  , 1 tab(s) orally once a day  · 	Nitrostat 0.4 mg sublingual tablet: Last Dose Taken:  , 1 tab(s) sublingual every 5 minutes, As Needed  · 	acarbose 100 mg oral tablet: Last Dose Taken:  , 1 tab(s) orally 3 times a day  · 	losartan 50 mg oral tablet: Last Dose Taken:  , 1 tab(s) orally once a day    PHYSICAL EXAM:  T(C): 36.1 (07-01-21 @ 04:48), Max: 36.5 (06-30-21 @ 22:41)  T(F): 97 (07-01-21 @ 04:48), Max: 97.7 (06-30-21 @ 22:41)  HR: 92 (07-01-21 @ 10:30) (90 - 112)  BP: 139/76 (07-01-21 @ 10:30) (128/83 - 163/80)  RR: 16 (07-01-21 @ 10:30) (16 - 20)  SpO2: 94% (07-01-21 @ 10:30) (91% - 94%)    	  Gen- awake, conversive, sitting up in chair  Head-NCAT; eyes: no corneal arcus noted b/l; no xanthelasmas   Neck- no JVD, no carotid bruit b/l  Respiratory- respirations non-labored; clear to auscultation b/l   Cardiovascular- S1S2, RRR, no murmur  Neurology- alert and oriented x 3, no focal deficits  Psych- normal affect; appearance, verbalizations, behaviors are appropriate   Skin- surgical dressings C/D/I     LABS:	                        12.3   7.19  )-----------( 126      ( 01 Jul 2021 06:31 )             38.0     07-01    142  |  108  |  19  ----------------------------<  166<H>  4.2   |  25  |  1.01    Ca    9.1      01 Jul 2021 06:31  Mg     2.1     07-01      Cholesterol, Serum: 165 mg/dL (6/8/21 12:22)  Triglycerides, Serum: 165 mg/dL (6/8/21 12:22)  HDL Cholesterol, Serum:50  mg/dL (6/8/21 12:22)  LDL Cholesterol, Calculated: 82 mg/dL (6/8/21 12:22)  HbA1c: 7.9% (6/8/21 12:22)    ASSESSMENT/RECOMMENDATIONS: 	  Patient's dietary, exercise and overall lifestyle habits were reviewed. The concept of atherosclerosis and its systemic nature was discussed with a focus on the need to get all cardiovascular risk factors to goal. At this time, I would like to make the following recommendations to optimize atherosclerotic risk factors.     RECOMMENDATIONS:   Anti-platelet Therapy: APT per primary team recommendation.   Lipid Therapy: High dose statin therapy would likely benefit this patient. He was prescribed atorvastatin 40mg daily, with the goal of lowering his LDL-C to <70 mg/dL.  Hypertension: Blood pressures during this stay were well-controlled.   Mediterranean Diet Score is 5. Some suggestions include continue incorporating 2 or more servings per day of vegetables, fruits, and whole grains. Increase intake of fish and legumes/beans to 2 or more servings per week. Aim to increase intake of healthy fats, such as olive oil and avocados, and have a handful of nuts/seeds most days. Reduce red/processed meat consumption to 2 or fewer times per week.   Exercise: Recommended gradually increasing activity to 30-45 minutes most days of the week once cleared by referring cardiologist. Cardiac rehab might benefit this patient and is covered by major insurance plans (other than co-pays), please refer.   Medication Adherence: Patient has no issues with adherence at this time.   Smoking: Smoking cessation was strongly encouraged and discussed with the patient. We suggested picking a day in the future that will be the planned quit date. We recommend following up with his PCP for further assistance, if needed for nicotine replacement therapy. The risks to overall health if he continues to smoke were discussed, and patient expressed understanding.   Obesity/Overweight: The patient was advised about specific mechanisms such as reduced portions and increased activity for efforts toward weight loss.   Glucometabolic State: Patient's blood sugar is above goal at this time. HbA1c is 7.9%. He is taking both an SGLT2i and GLP1-RA, which are ideal, as these newer agents have cardiovascular benefits as well as glucose control. The goal is to have patients on guideline-based cardioprotective agents that will reduce the risk of recurrent CV events, while reducing the dependence on insulin. Lifestyle modifications will hopefully benefit him further.  Sleep Apnea: The patient is at low risk for sleep apnea.   Psychological Stress: The patient appears to be coping with stressors well at this time.     Thank you for the opportunity to see this patient. Please feel free to contact Prevention if there are any questions, or if you feel that your patient would benefit from continued follow-up visits with the Program.    Mirna Hnaley, [facility]-BC  Cardiovascular Prevention     Billie Gregg MD  System Director, Cardiovascular Prevention     Preventive Cardiology Consultation Note    CHIEF COMPLAINT: s/p CABG requiring cardiovascular prevention optimization and education    HISTORY OF PRESENT ILLNESS: 65 y/o Cantonese speaking male, current smoker, presents with PMH of HTN, HLD, DM, hepatitis B, PVD s/p PTA SFA and DEANNA, CAD s/p PCIs- RCA 2012 @ [place], PCI LAD and LCx 2016 @ [facility]. He presented to cardiologist Dr. Radha Leary with c/o dyspnea and chest tightness with exertion when walking more than 2 blocks or climbing 1 flight of stairs. Symptoms relieved with rest. He also reports b/l Leg claudication of the thighs when walking a couple of blocks X few months, worse with stairs and relieved with rest. NST 3/22/21 revealed EF 55 %, moderate amount of ischemia in anterior region on the stress images which was/were reversible in the rest images. 5/26/21 s/p cardiac cath that revealed severe 3 vessel CAD w/ISR. s/p ASHWIN->proxRCA. Patient is now s/p MIDCAB, MIN-LAD 6/28/21.    Review of systems otherwise negative.     Lifestyle History:  Mediterranean Diet Score (9 question survey) was 5.   (8-9: optimal, 6-7: near-optimal, 4-5: suboptimal, 0-3: markedly suboptimal)  Exercise: Patient reports exercising at a moderate level for <30 minutes per week.   Smoking: Current smoker (1/2 PPD x 40 years).   Stress: Patient denies any stress.     PAST MEDICAL & SURGICAL HISTORY:  Hypertension    Hyperlipidemia    Hepatitis B    PVD (peripheral vascular disease)    Diabetes mellitus    CAD (coronary artery disease)    History of BPH    H/O eye surgery      FAMILY HISTORY:   Patient denies any first degree family history of premature CAD or CVA.     Allergies:   No Known Allergies      HOME MEDICATIONS:   · 	Plavix 75 mg oral tablet: Last Dose Taken:  , 1 tab(s) orally once a day   · 	Actos 15 mg oral tablet: 1 tab(s) orally once a day  · 	tamsulosin 0.4 mg oral capsule: Last Dose Taken:  , 1 cap(s) orally once a day  · 	glipiZIDE 10 mg oral tablet: Last Dose Taken:  , 1 tab(s) orally once a day  · 	Vascepa 1 g oral capsule: Last Dose Taken:  , 2 cap(s) orally 2 times a day  · 	Neurontin 100 mg oral capsule: Last Dose Taken:  , orally once a day  · 	aspirin 81 mg oral tablet: Last Dose Taken:  , orally once a day  · 	Ranexa 1000 mg oral tablet, extended release: Last Dose Taken:  , 1 tab(s) orally 2 times a day  · 	Toprol- mg oral tablet, extended release: Last Dose Taken:  , 1 tab(s) orally once a day  · 	Xultophy 100 units-3.6 mg/mL subcutaneous solution: Last Dose Taken:  , unit(s) subcutaneous  · 	Xigduo XR 5 mg-500 mg oral tablet, extended release: Last Dose Taken:  , 1 tab(s) orally once a day (in the morning)  · 	Baraclude 1 mg oral tablet: Last Dose Taken:  , 1 tab(s) orally once a day  · 	Nitrostat 0.4 mg sublingual tablet: Last Dose Taken:  , 1 tab(s) sublingual every 5 minutes, As Needed  · 	acarbose 100 mg oral tablet: Last Dose Taken:  , 1 tab(s) orally 3 times a day  · 	losartan 50 mg oral tablet: Last Dose Taken:  , 1 tab(s) orally once a day    PHYSICAL EXAM:  T(C): 36.1 (07-01-21 @ 04:48), Max: 36.5 (06-30-21 @ 22:41)  T(F): 97 (07-01-21 @ 04:48), Max: 97.7 (06-30-21 @ 22:41)  HR: 92 (07-01-21 @ 10:30) (90 - 112)  BP: 139/76 (07-01-21 @ 10:30) (128/83 - 163/80)  RR: 16 (07-01-21 @ 10:30) (16 - 20)  SpO2: 94% (07-01-21 @ 10:30) (91% - 94%)    	  Gen- awake, conversive, sitting up in chair  Head-NCAT; eyes: no corneal arcus noted b/l; no xanthelasmas   Neck- no JVD, no carotid bruit b/l  Respiratory- respirations non-labored; clear to auscultation b/l   Cardiovascular- S1S2, RRR, no murmur  Neurology- alert and oriented x 3, no focal deficits  Psych- normal affect; appearance, verbalizations, behaviors are appropriate   Skin- surgical dressings C/D/I     LABS:	                        12.3   7.19  )-----------( 126      ( 01 Jul 2021 06:31 )             38.0     07-01    142  |  108  |  19  ----------------------------<  166<H>  4.2   |  25  |  1.01    Ca    9.1      01 Jul 2021 06:31  Mg     2.1     07-01      Cholesterol, Serum: 165 mg/dL (6/8/21 12:22)  Triglycerides, Serum: 165 mg/dL (6/8/21 12:22)  HDL Cholesterol, Serum: 50 mg/dL (6/8/21 12:22)  LDL Cholesterol, Calculated: 82 mg/dL (6/8/21 12:22)  HbA1c: 7.9% (6/8/21 12:22)    ASSESSMENT/RECOMMENDATIONS: 	  Patient's dietary, exercise and overall lifestyle habits were reviewed. The concept of atherosclerosis and its systemic nature was discussed with a focus on the need to get all cardiovascular risk factors to goal. At this time, I would like to make the following recommendations to optimize atherosclerotic risk factors.     RECOMMENDATIONS:   Anti-platelet Therapy: APT per primary team recommendation.   Lipid Therapy: High dose statin therapy would likely benefit this patient. He was prescribed atorvastatin 40mg daily, with the goal of lowering his LDL-C to <70 mg/dL. Patient is also taking Vascepa to help manage his triglycerides.   Hypertension: Blood pressures during this stay were well-controlled.   Mediterranean Diet Score is 5. Some suggestions include continue incorporating 2 or more servings per day of vegetables, fruits, and whole grains. Increase intake of fish and legumes/beans to 2 or more servings per week. Aim to increase intake of healthy fats, such as olive oil and avocados, and have a handful of nuts/seeds most days. Reduce red/processed meat consumption to 2 or fewer times per week.   Exercise: Recommended gradually increasing activity to 30-45 minutes most days of the week once cleared by referring cardiologist. Cardiac rehab might benefit this patient and is covered by major insurance plans (other than co-pays), please refer.   Medication Adherence: Patient has no issues with adherence at this time.   Smoking: Smoking cessation was strongly encouraged and discussed with the patient. We suggested picking a day in the future that will be the planned quit date. We recommend following up with his PCP for further assistance, if needed for nicotine replacement therapy. The risks to overall health if he continues to smoke were discussed, and patient expressed understanding. Smoking cessation resources offered.   Obesity/Overweight: The patient was advised about specific mechanisms such as reduced portions and increased activity for efforts toward weight loss.   Glucometabolic State: Patient's blood sugar is above goal at this time. HbA1c is 7.9%. He is taking both an SGLT2i and GLP1-RA, which are ideal, as these newer agents have cardiovascular benefits as well as glucose control. The goal is to have patients on guideline-based cardioprotective agents that will reduce the risk of recurrent CV events, while reducing the dependence on insulin. Lifestyle modifications will hopefully benefit him further.  Sleep Apnea: The patient is at low risk for sleep apnea.   Psychological Stress: The patient appears to be coping with stressors well at this time.     Thank you for the opportunity to see this patient. Please feel free to contact Prevention if there are any questions, or if you feel that your patient would benefit from continued follow-up visits with the Program.    Mirna Hanley, [facility]-BC  Cardiovascular Prevention     Billie Gregg MD  System Director, Cardiovascular Prevention

## 2021-06-30 NOTE — CHART NOTE - NSCHARTNOTESELECT_GEN_ALL_CORE
Pt ambulatory to bathroom, meal tray ordered     Sheba Villarreal RN  11/13/17 5460 Jerzy removal/Event Note

## 2021-07-01 ENCOUNTER — TRANSCRIPTION ENCOUNTER (OUTPATIENT)
Age: 66
End: 2021-07-01

## 2021-07-01 VITALS
SYSTOLIC BLOOD PRESSURE: 139 MMHG | RESPIRATION RATE: 16 BRPM | HEART RATE: 92 BPM | OXYGEN SATURATION: 94 % | DIASTOLIC BLOOD PRESSURE: 76 MMHG

## 2021-07-01 DIAGNOSIS — Z95.1 PRESENCE OF AORTOCORONARY BYPASS GRAFT: ICD-10-CM

## 2021-07-01 DIAGNOSIS — Z09 ENCOUNTER FOR FOLLOW-UP EXAMINATION AFTER COMPLETED TREATMENT FOR CONDITIONS OTHER THAN MALIGNANT NEOPLASM: ICD-10-CM

## 2021-07-01 PROBLEM — Z87.438 PERSONAL HISTORY OF OTHER DISEASES OF MALE GENITAL ORGANS: Chronic | Status: ACTIVE | Noted: 2021-06-18

## 2021-07-01 LAB
ANION GAP SERPL CALC-SCNC: 9 MMOL/L — SIGNIFICANT CHANGE UP (ref 5–17)
BUN SERPL-MCNC: 19 MG/DL — SIGNIFICANT CHANGE UP (ref 7–23)
CALCIUM SERPL-MCNC: 9.1 MG/DL — SIGNIFICANT CHANGE UP (ref 8.4–10.5)
CHLORIDE SERPL-SCNC: 108 MMOL/L — SIGNIFICANT CHANGE UP (ref 96–108)
CO2 SERPL-SCNC: 25 MMOL/L — SIGNIFICANT CHANGE UP (ref 22–31)
CREAT SERPL-MCNC: 1.01 MG/DL — SIGNIFICANT CHANGE UP (ref 0.5–1.3)
GLUCOSE BLDC GLUCOMTR-MCNC: 173 MG/DL — HIGH (ref 70–99)
GLUCOSE SERPL-MCNC: 166 MG/DL — HIGH (ref 70–99)
HCT VFR BLD CALC: 38 % — LOW (ref 39–50)
HGB BLD-MCNC: 12.3 G/DL — LOW (ref 13–17)
MAGNESIUM SERPL-MCNC: 2.1 MG/DL — SIGNIFICANT CHANGE UP (ref 1.6–2.6)
MCHC RBC-ENTMCNC: 31 PG — SIGNIFICANT CHANGE UP (ref 27–34)
MCHC RBC-ENTMCNC: 32.4 GM/DL — SIGNIFICANT CHANGE UP (ref 32–36)
MCV RBC AUTO: 95.7 FL — SIGNIFICANT CHANGE UP (ref 80–100)
NRBC # BLD: 0 /100 WBCS — SIGNIFICANT CHANGE UP (ref 0–0)
PLATELET # BLD AUTO: 126 K/UL — LOW (ref 150–400)
POTASSIUM SERPL-MCNC: 4.2 MMOL/L — SIGNIFICANT CHANGE UP (ref 3.5–5.3)
POTASSIUM SERPL-SCNC: 4.2 MMOL/L — SIGNIFICANT CHANGE UP (ref 3.5–5.3)
RBC # BLD: 3.97 M/UL — LOW (ref 4.2–5.8)
RBC # FLD: 13.1 % — SIGNIFICANT CHANGE UP (ref 10.3–14.5)
SODIUM SERPL-SCNC: 142 MMOL/L — SIGNIFICANT CHANGE UP (ref 135–145)
WBC # BLD: 7.19 K/UL — SIGNIFICANT CHANGE UP (ref 3.8–10.5)
WBC # FLD AUTO: 7.19 K/UL — SIGNIFICANT CHANGE UP (ref 3.8–10.5)

## 2021-07-01 PROCEDURE — 97116 GAIT TRAINING THERAPY: CPT

## 2021-07-01 PROCEDURE — 94002 VENT MGMT INPAT INIT DAY: CPT

## 2021-07-01 PROCEDURE — 71046 X-RAY EXAM CHEST 2 VIEWS: CPT | Mod: 26

## 2021-07-01 PROCEDURE — 84295 ASSAY OF SERUM SODIUM: CPT

## 2021-07-01 PROCEDURE — 82330 ASSAY OF CALCIUM: CPT

## 2021-07-01 PROCEDURE — 85610 PROTHROMBIN TIME: CPT

## 2021-07-01 PROCEDURE — 84132 ASSAY OF SERUM POTASSIUM: CPT

## 2021-07-01 PROCEDURE — 82803 BLOOD GASES ANY COMBINATION: CPT

## 2021-07-01 PROCEDURE — 80048 BASIC METABOLIC PNL TOTAL CA: CPT

## 2021-07-01 PROCEDURE — 85027 COMPLETE CBC AUTOMATED: CPT

## 2021-07-01 PROCEDURE — 82962 GLUCOSE BLOOD TEST: CPT

## 2021-07-01 PROCEDURE — 85025 COMPLETE CBC W/AUTO DIFF WBC: CPT

## 2021-07-01 PROCEDURE — 86891 AUTOLOGOUS BLOOD OP SALVAGE: CPT

## 2021-07-01 PROCEDURE — 86923 COMPATIBILITY TEST ELECTRIC: CPT

## 2021-07-01 PROCEDURE — 71045 X-RAY EXAM CHEST 1 VIEW: CPT

## 2021-07-01 PROCEDURE — 86850 RBC ANTIBODY SCREEN: CPT

## 2021-07-01 PROCEDURE — 80053 COMPREHEN METABOLIC PANEL: CPT

## 2021-07-01 PROCEDURE — 86900 BLOOD TYPING SEROLOGIC ABO: CPT

## 2021-07-01 PROCEDURE — 97530 THERAPEUTIC ACTIVITIES: CPT

## 2021-07-01 PROCEDURE — 86901 BLOOD TYPING SEROLOGIC RH(D): CPT

## 2021-07-01 PROCEDURE — 94640 AIRWAY INHALATION TREATMENT: CPT

## 2021-07-01 PROCEDURE — 84100 ASSAY OF PHOSPHORUS: CPT

## 2021-07-01 PROCEDURE — 71046 X-RAY EXAM CHEST 2 VIEWS: CPT

## 2021-07-01 PROCEDURE — P9045: CPT

## 2021-07-01 PROCEDURE — 83735 ASSAY OF MAGNESIUM: CPT

## 2021-07-01 PROCEDURE — S2900: CPT

## 2021-07-01 PROCEDURE — 93005 ELECTROCARDIOGRAM TRACING: CPT

## 2021-07-01 PROCEDURE — 36415 COLL VENOUS BLD VENIPUNCTURE: CPT

## 2021-07-01 PROCEDURE — 97161 PT EVAL LOW COMPLEX 20 MIN: CPT

## 2021-07-01 PROCEDURE — 85730 THROMBOPLASTIN TIME PARTIAL: CPT

## 2021-07-01 RX ORDER — GABAPENTIN 400 MG/1
0 CAPSULE ORAL
Qty: 0 | Refills: 0 | DISCHARGE

## 2021-07-01 RX ORDER — ATORVASTATIN CALCIUM 80 MG/1
1 TABLET, FILM COATED ORAL
Qty: 30 | Refills: 0
Start: 2021-07-01 | End: 2021-07-30

## 2021-07-01 RX ORDER — GABAPENTIN 400 MG/1
1 CAPSULE ORAL
Qty: 30 | Refills: 0
Start: 2021-07-01 | End: 2021-07-30

## 2021-07-01 RX ORDER — TAMSULOSIN HYDROCHLORIDE 0.4 MG/1
1 CAPSULE ORAL
Qty: 0 | Refills: 0 | DISCHARGE

## 2021-07-01 RX ORDER — ASPIRIN/CALCIUM CARB/MAGNESIUM 324 MG
0 TABLET ORAL
Qty: 0 | Refills: 0 | DISCHARGE

## 2021-07-01 RX ORDER — ACETAMINOPHEN 500 MG
2 TABLET ORAL
Qty: 240 | Refills: 0
Start: 2021-07-01 | End: 2021-07-30

## 2021-07-01 RX ORDER — ICOSAPENT ETHYL 500 MG/1
2 CAPSULE, LIQUID FILLED ORAL
Qty: 0 | Refills: 0 | DISCHARGE

## 2021-07-01 RX ORDER — METOPROLOL TARTRATE 50 MG
1 TABLET ORAL
Qty: 60 | Refills: 0
Start: 2021-07-01 | End: 2021-07-30

## 2021-07-01 RX ORDER — RANOLAZINE 500 MG/1
1 TABLET, FILM COATED, EXTENDED RELEASE ORAL
Qty: 0 | Refills: 0 | DISCHARGE

## 2021-07-01 RX ORDER — NITROGLYCERIN 6.5 MG
1 CAPSULE, EXTENDED RELEASE ORAL
Qty: 0 | Refills: 0 | DISCHARGE

## 2021-07-01 RX ORDER — POLYETHYLENE GLYCOL 3350 17 G/17G
17 POWDER, FOR SOLUTION ORAL
Qty: 119 | Refills: 0
Start: 2021-07-01 | End: 2021-07-07

## 2021-07-01 RX ORDER — PANTOPRAZOLE SODIUM 20 MG/1
1 TABLET, DELAYED RELEASE ORAL
Qty: 30 | Refills: 0
Start: 2021-07-01 | End: 2021-07-30

## 2021-07-01 RX ORDER — LOSARTAN POTASSIUM 100 MG/1
1 TABLET, FILM COATED ORAL
Qty: 0 | Refills: 0 | DISCHARGE

## 2021-07-01 RX ORDER — OXYCODONE HYDROCHLORIDE 5 MG/1
1 TABLET ORAL
Qty: 12 | Refills: 0
Start: 2021-07-01 | End: 2021-07-03

## 2021-07-01 RX ORDER — GABAPENTIN 400 MG/1
1 CAPSULE ORAL
Qty: 0 | Refills: 0 | DISCHARGE
Start: 2021-07-01 | End: 2021-07-30

## 2021-07-01 RX ORDER — ENTECAVIR 0.5 MG/1
1 TABLET ORAL
Qty: 30 | Refills: 0
Start: 2021-07-01 | End: 2021-07-30

## 2021-07-01 RX ORDER — ASPIRIN/CALCIUM CARB/MAGNESIUM 324 MG
1 TABLET ORAL
Qty: 30 | Refills: 0
Start: 2021-07-01 | End: 2021-07-30

## 2021-07-01 RX ORDER — METOPROLOL TARTRATE 50 MG
1 TABLET ORAL
Qty: 0 | Refills: 0 | DISCHARGE

## 2021-07-01 RX ORDER — CLOPIDOGREL BISULFATE 75 MG/1
1 TABLET, FILM COATED ORAL
Qty: 30 | Refills: 0
Start: 2021-07-01 | End: 2021-07-30

## 2021-07-01 RX ORDER — ENTECAVIR 0.5 MG/1
1 TABLET ORAL
Qty: 0 | Refills: 0 | DISCHARGE

## 2021-07-01 RX ADMIN — SODIUM CHLORIDE 3 MILLILITER(S): 9 INJECTION INTRAMUSCULAR; INTRAVENOUS; SUBCUTANEOUS at 06:24

## 2021-07-01 RX ADMIN — Medication 25 MILLIGRAM(S): at 00:33

## 2021-07-01 RX ADMIN — Medication 2: at 07:01

## 2021-07-01 RX ADMIN — PANTOPRAZOLE SODIUM 40 MILLIGRAM(S): 20 TABLET, DELAYED RELEASE ORAL at 06:55

## 2021-07-01 RX ADMIN — Medication 25 MILLIGRAM(S): at 06:55

## 2021-07-01 RX ADMIN — Medication 3 UNIT(S): at 07:00

## 2021-07-01 RX ADMIN — HEPARIN SODIUM 5000 UNIT(S): 5000 INJECTION INTRAVENOUS; SUBCUTANEOUS at 06:55

## 2021-07-01 NOTE — DISCHARGE NOTE PROVIDER - NSDCCPTREATMENT_GEN_ALL_CORE_FT
PRINCIPAL PROCEDURE  Procedure: Minimally invasive direct coronary artery bypass (MIDCAB) with transesophageal echocardiography (YONY)  Findings and Treatment:

## 2021-07-01 NOTE — DISCHARGE NOTE PROVIDER - NSDCMRMEDTOKEN_GEN_ALL_CORE_FT
acarbose 100 mg oral tablet: 1 tab(s) orally 3 times a day  acetaminophen 325 mg oral tablet: 2 tab(s) orally every 6 hours, As needed, Mild Pain (1 - 3)  Actos 15 mg oral tablet: 1 tab(s) orally once a day  aspirin 81 mg oral tablet, chewable: 1 tab(s) orally once a day  atorvastatin 40 mg oral tablet: 1 tab(s) orally once a day (at bedtime)  clopidogrel 75 mg oral tablet: 1 tab(s) orally once a day  entecavir 1 mg oral tablet: 1 tab(s) orally once a day  gabapentin 100 mg oral capsule: 1 cap(s) orally once a day  glipiZIDE 10 mg oral tablet: 1 tab(s) orally once a day  metoprolol tartrate 50 mg oral tablet: 1 tab(s) orally every 12 hours   oxyCODONE 5 mg oral tablet: 1 tab(s) orally every 6 hours, As Needed -Moderate Pain (4 - 6) MDD:4   pantoprazole 40 mg oral delayed release tablet: 1 tab(s) orally once a day (before a meal)  polyethylene glycol 3350 oral powder for reconstitution: 17 gram(s) orally once a day  Xigduo XR 5 mg-500 mg oral tablet, extended release: 1 tab(s) orally once a day (in the morning)  Xultophy 100 units-3.6 mg/mL subcutaneous solution: unit(s) subcutaneous

## 2021-07-01 NOTE — DISCHARGE NOTE NURSING/CASE MANAGEMENT/SOCIAL WORK - NSDCFUADDAPPT_GEN_ALL_CORE_FT
-Please follow up with Dr. Egan on 7/6/21 at 10:15am. The office is located at [facility], [facility], 4th floor. Call us with any questions #[phone].    -Please follow up with your cardiologist, Dr. Radha Leary in 1-2 weeks. Please call her office to schedule an appointment. The phone number is [phone].    -Please follow up with your endocrinologist in 1-2 weeks to manage your diabetes after surgery.

## 2021-07-01 NOTE — DISCHARGE NOTE PROVIDER - CARE PROVIDER_API CALL
Chidi Egan (MD)  Cardiovascular Surgery  [address], [address]th [place] [address]  Phone: [phone]  Fax: [phone]  Scheduled Appointment: 07/06/2021 10:15 AM    Radha Leary  CARDIOLOGY  [address] [facility] [address]  [place] [address]  Phone: [phone]  Fax: [phone]  Follow Up Time: 2 weeks

## 2021-07-01 NOTE — DISCHARGE NOTE PROVIDER - NSDCFUADDAPPT_GEN_ALL_CORE_FT
-Please follow up with Dr. Egan on 7/6/21 at 10:15am. The office is located at [facility], [facility], 4th floor. Call us with any questions #[phone].    -Please follow up with your cardiologist, Dr. Radha Leary in 1-2 weeks. Please call her office to schedule an appointment. The phone number is [phone].    -Please follow up with your endocrinologist in 1-2 weeks to manage your diabetes after surgery.

## 2021-07-01 NOTE — DISCHARGE NOTE PROVIDER - NSDCFUADDINST_GEN_ALL_CORE_FT
-Walk daily as tolerated and use your incentive spirometer every hour.    -No driving or strenuous activity/exercise for 6 weeks, or until cleared by your surgeon.    -Gently clean your incisions with anti-bacterial soap and water, pat dry.  You may leave them open to air.    -Call your doctor if you have shortness of breath, chest pain not relieved by pain medication, dizziness, fever >101.5, or increased  redness or drainage from incisions.

## 2021-07-01 NOTE — DISCHARGE NOTE PROVIDER - CARE PROVIDERS DIRECT ADDRESSES
[email or web address]

## 2021-07-01 NOTE — DISCHARGE NOTE NURSING/CASE MANAGEMENT/SOCIAL WORK - PATIENT PORTAL LINK FT
You can access the FollowMyHealth Patient Portal offered by [facility] by registering at the following website: [email or web address]. By joining [facility]’s FollowMyHealth portal, you will also be able to view your health information using other applications (apps) compatible with our system.

## 2021-07-01 NOTE — PROGRESS NOTE ADULT - ASSESSMENT
A/P:    65 y.o Cantonese speaking male, current smoker, with PMHx HTN, HLD, DM, hepatitis B, PVD s/p PTA SFA and DEANNA, CAD s/p PCIs (2012/2016) who presented to cardiologist Dr. Leary with dyspnea and chest tightness with exertion. NST 3/22/21 revealed EF 55%, moderate ischemia in anterior region on stress images. 5/26/21 he underwent cardiac catheterization which revealed severe vessel CAD. ASHWIN was placed to proximal RCA. He was evaluated by Dr. Egan as an outpatient and was deemed to be a good surgical candidate. On 6/28/21 he underwent an uncomplicated MIDCAB (LIMA-LAD), EF 55%. Post operatively he was brought to the CTICU in stable condition and was extubated later on POD0. POD1 chest tube was removed and CXR was stable. Beta blocker was started and he was transferred to [facility].     Neurovascular:   - No delirium. Pain well controlled with current regimen.  -Continue tylenol, oxy IR PRN  - Continue gabapentin 100 mg daily    Cardiovascular:   - POD1 s/p MIDCAB, EF 55%  -Hemodynamically stable. HR controlled ()  - Hx of HTN, BP controlled (93//91), continue metoprolol 12.5 q12 hours  - CAD s/p MIDCAB: continue ASA plavix  - PVD: No xarelto per Dr. Egan   - To have PCI 4-6 weeks     Respiratory:   - 02 Sat = 96% on RA.  -If on oxygen wean to RA from for O2 Sat > 93%.  -Encourage C+DB and Use of IS 10x / hr while awake.  -F/u AM CXR     GI:   - Stable.  -Continue GI PPX with protonix  -PO Diet.    Renal / :  - BUN/Cr stable at 17/0.94  -Continue to monitor renal function.  -Monitor I/O's.  - Replete electrolytes PRN  - Continue flomax/ tamsulosin     Endocrine:    -A1c 7.9, known hx DM, continue lantus 10, lispro 3, MISS  -TSH 1.780, no known hx thyroid disease     Hematologic:  -H/H stable at 11.4/34.5 with no obvious signs of bleeding  -Coagulation Panel.    ID:  -Pt remains afebrile with no elevation in WBC or signs of infection  -Continue to monitor CBC  -Observe for SIRS/Sepsis Syndrome.  - Hx Hep B, continue entecavir 1 mg daily     Prophylaxis:  -DVT prophylaxis with 5000 SubQ Heparin q8h.  -SCD's    Disposition:  -Home when medically appropriate.  
65 year old M, current smoker, PMHx HTN, HLD, DM, hepatitis B, PVD s/p PTA SFA and DEANNA, CAD s/p PCIs (2012/2016) who presented to cardiologist Dr. Leary with dyspnea and chest tightness with exertion. NST 3/22/21 revealed EF 55%, moderate ischemia in anterior region on stress images. 5/26/21 he underwent cardiac catheterization which revealed severe vessel CAD. ASHWIN was placed to proximal RCA. He was evaluated by Dr. Egan as an outpatient and was deemed to be a good surgical candidate. On 6/28/21 he underwent an uncomplicated MIDCAB (LIMA-LAD), EF 55%. Post operatively he was brought to the CTICU in stable condition and was extubated later on POD0. POD1 chest tube was removed and CXR was stable. Beta blocker was started and he was transferred to [facility]. Today POD2.     Neurovascular:   - No delirium. Pain well controlled with current regimen.  -Continue tylenol, oxy IR PRN  - Continue gabapentin 100 mg daily    Cardiovascular: -Hemodynamically stable. HR controlled  - Hx HTN, HLD, CAD, PVD now s/p s/p MIDCAB, EF 55%  - continue metoprolol 25mg q8, asa 81 mg daily, plavix 75mg daily  - holding home A/C- will be d/c on asa plavix per Dr. Egan   - To have PCI 4-6 weeks     Respiratory:   - 02 Sat = 96% on RA.  -Encourage C+DB and Use of IS 10x / hr while awake.  -CXR stable, AM PA/LAT    GI:   - Stable.  -Continue GI PPX with protonix  -PO Diet.    Renal / :  - BUN/Cr stable at 24/1.23 from 17/0.94  -Continue to monitor renal function.  -Monitor I/O's.  - Replete electrolytes PRN  - Continue flomax/ tamsulosin   - Negative 1.3 L last 24 hours, keep even today, encourage PO hydration     Endocrine:    -A1c 7.9, known hx DM, continue lantus 10, lispro 3, MISS  -TSH 1.780, no known hx thyroid disease     Hematologic:  -H/H stable at 13/41 with no obvious signs of bleeding  -Coagulation Panel.    ID:  -Pt remains afebrile with no elevation in WBC or signs of infection  -Continue to monitor CBC  -Observe for SIRS/Sepsis Syndrome.  - Hx Hep B, continue entecavir 1 mg daily     Prophylaxis:  -DVT prophylaxis with 5000 SubQ Heparin q8h.  -SCD's    Disposition:  -Home when medically appropriate.  
 Care Plan/Procedures:  Discharge Diagnoses, Assessment and Plan of Treatment	PRINCIPAL DISCHARGE DIAGNOSIS  Diagnosis: CAD (coronary artery disease)  Assessment and Plan of Treatment:  Discharge Procedures, Findings and Treatment	PRINCIPAL PROCEDURE  Procedure: Minimally invasive direct coronary artery bypass (MIDCAB) with transesophageal echocardiography (YONY)  Findings and Treatment:  Goal(s)	To get better and follow your care plan as instructed.     Follow Up:  Activity	Do not drive or operate machinery, No heavy lifting/straining, Showering allowed, Stairs allowed, Walking - Indoors allowed, Walking - Outdoors allowed  Additional Instructions	-Walk daily as tolerated and use your incentive spirometer every hour.    -No driving or strenuous activity/exercise for 6 weeks, or until cleared by your surgeon.    -Gently clean your incisions with anti-bacterial soap and water, pat dry.  You may leave them open to air.    -Call your doctor if you have shortness of breath, chest pain not relieved by pain medication, dizziness, fever >101.5, or increased  redness or drainage from incisions.  Care Providers for Follow up (PCP/Outpatient Provider)	Chidi Egan)  Cardiovascular Surgery  [address] [place] [address]  Phone: [phone]  Fax: [phone]  Scheduled Appointment: 07/06/2021 10:15 AM    Radha Leary  CARDIOLOGY  [address] [place] [address],[address]  [place] [address]  Phone: [phone]  Fax: [phone]  Follow Up Time: 2 weeks  Patient's Scheduled Appointments	[facility] ; 07/06/2021 ; NPP CT Surg [address]  Additional Scheduled Appointments	-Please follow up with Dr. Egan on 7/6/21 at 10:15am. The office is located at [facility], [address] [facility]. Call us with any questions #[phone].    -Please follow up with your cardiologist, Dr. Radha Leary in 1-2 weeks. Please call her office to schedule an appointment. The phone number is [phone].    -Please follow up with your endocrinologist in 1-2 weeks to manage your diabetes after surgery.

## 2021-07-01 NOTE — DISCHARGE NOTE PROVIDER - PROVIDER TOKENS
PROVIDER:[TOKEN:[[id number]],SCHEDULEDAPPT:[07/06/2021],SCHEDULEDAPPTTIME:[10:15 AM]],PROVIDER:[TOKEN:[[id number]],FOLLOWUP:[2 weeks]]

## 2021-07-01 NOTE — PROGRESS NOTE ADULT - SUBJECTIVE AND OBJECTIVE BOX
`Patient discussed on morning rounds with Dr. Egan    Operation / Date: 6/28/21 Robotic assisted MIDCAB (LIMA-LAD), EF normal    SUBJECTIVE ASSESSMENT:  66y Male seen and examined. Feels well, offers no acute complaints. Ambulated 3x yesterday, using IS only pulling 500cc, tolerating PO Diet, no BM but + flatus. Denies fever, chest pain, palpitations SOB, abdominal pain, n/v.         Vital Signs Last 24 Hrs  T(C): 36.6 (30 Jun 2021 04:38), Max: 36.8 (29 Jun 2021 10:10)  T(F): 97.8 (30 Jun 2021 04:38), Max: 98.2 (29 Jun 2021 10:10)  HR: 105 (30 Jun 2021 04:38) (86 - 106)  BP: 126/72 (30 Jun 2021 04:38) (117/65 - 155/77)  BP(mean): 94 (30 Jun 2021 04:38) (83 - 108)  RR: 18 (30 Jun 2021 04:38) (17 - 18)  SpO2: 93% (30 Jun 2021 04:38) (93% - 97%)  I&O's Detail    29 Jun 2021 07:01  -  30 Jun 2021 07:00  --------------------------------------------------------  IN:    Albumin 5%  - 250 mL: 250 mL    Oral Fluid: 1080 mL    sodium chloride 0.9%: 50 mL  Total IN: 1380 mL    OUT:    Chest Tube (mL): 10 mL    Drain (mL): 110 mL    Indwelling Catheter - Urethral (mL): 225 mL    Voided (mL): 2430 mL  Total OUT: 2775 mL    Total NET: -1395 mL      30 Jun 2021 07:01  -  30 Jun 2021 08:57  --------------------------------------------------------  IN:    Albumin 5%  - 250 mL: 250 mL  Total IN: 250 mL    OUT:  Total OUT: 0 mL    Total NET: 250 mL    CHEST TUBE:  NO  JERZY DRAIN:  Yes  EPICARDIAL WIRES: No.  TIE DOWNS: Yes  CARBAJAL: No.    PHYSICAL EXAM:    General: Patient lying comfortably in bed, no acute distress     Neurological: Alert and oriented. No focal neurological deficits     Cardiovascular: S1S2, RRR, no murmurs appreciated on exam     Respiratory: Clear to ausculation bilaterally, no wheeze/rhonchi/rales    Gastrointestinal: + BS, soft, non tender, non distended     Extremities: Warm and well perfused. No edema, no calf tenderness     Vascular: palpable peripheral pulses b/l     Incision Sites: Thoracotomy: clean, open to air, no signs of infeciton. CT site: CDI. +Jerzy.    LABS:                        13.2   9.64  )-----------( 149      ( 30 Jun 2021 06:08 )             41.4       COUMADIN:  No    PT/INR - ( 29 Jun 2021 02:27 )   PT: 13.0 sec;   INR: 1.09          PTT - ( 29 Jun 2021 02:27 )  PTT:30.2 sec    06-30    138  |  105  |  24<H>  ----------------------------<  199<H>  4.4   |  23  |  1.23    Ca    8.8      30 Jun 2021 06:08  Phos  3.0     06-29  Mg     2.3     06-29    TPro  5.7<L>  /  Alb  3.6  /  TBili  0.4  /  DBili  x   /  AST  16  /  ALT  14  /  AlkPhos  38<L>  06-29          MEDICATIONS  (STANDING):  aspirin  chewable 81 milliGRAM(s) Oral daily  atorvastatin 40 milliGRAM(s) Oral at bedtime  clopidogrel Tablet 75 milliGRAM(s) Oral daily  dextrose 40% Gel 15 Gram(s) Oral once  dextrose 5%. 1000 milliLiter(s) (50 mL/Hr) IV Continuous <Continuous>  dextrose 5%. 1000 milliLiter(s) (100 mL/Hr) IV Continuous <Continuous>  dextrose 50% Injectable 25 Gram(s) IV Push once  dextrose 50% Injectable 12.5 Gram(s) IV Push once  dextrose 50% Injectable 25 Gram(s) IV Push once  entecavir 1 milliGRAM(s) Oral daily  gabapentin 100 milliGRAM(s) Oral daily  glucagon  Injectable 1 milliGRAM(s) IntraMuscular once  heparin   Injectable 5000 Unit(s) SubCutaneous every 8 hours  insulin glargine Injectable (LANTUS) 10 Unit(s) SubCutaneous at bedtime  insulin lispro (ADMELOG) corrective regimen sliding scale   SubCutaneous Before meals and at bedtime  insulin lispro Injectable (ADMELOG) 3 Unit(s) SubCutaneous three times a day before meals  metoprolol tartrate 25 milliGRAM(s) Oral every 8 hours  pantoprazole    Tablet 40 milliGRAM(s) Oral before breakfast  polyethylene glycol 3350 17 Gram(s) Oral daily  sodium chloride 0.9% lock flush 3 milliLiter(s) IV Push every 8 hours  tamsulosin 0.4 milliGRAM(s) Oral at bedtime    MEDICATIONS  (PRN):  acetaminophen   Tablet .. 650 milliGRAM(s) Oral every 6 hours PRN Mild Pain (1 - 3)  oxyCODONE    IR 5 milliGRAM(s) Oral every 4 hours PRN Moderate Pain (4 - 6)  oxyCODONE    IR 10 milliGRAM(s) Oral every 6 hours PRN Severe Pain (7 - 10)        RADIOLOGY & ADDITIONAL TESTS:    
CTICU  CRITICAL  CARE  attending     Hand off received 					   Pertinent clinical, laboratory, radiographic, hemodynamic, echocardiographic, respiratory data, microbiologic data and chart were reviewed and analyzed frequently throughout the course of the day and night  Patient seen and examined with CTS/ SH attending at bedside  Pt is a 66y , Male, HEALTH ISSUES - PROBLEM Dx:      , FAMILY HISTORY:  No pertinent family history in first degree relatives    PAST MEDICAL & SURGICAL HISTORY:  Hypertension    Hyperlipidemia    Hepatitis B    PVD (peripheral vascular disease)    Diabetes mellitus    CAD (coronary artery disease)    History of BPH    H/O eye surgery      Patient is a 66y old  Male who presents with a chief complaint of CAD (18 Jun 2021 13:26)      14 system review limited by mentation and multiorgan morbidity     Vital signs, hemodynamic and respiratory parameters were reviewed from the bedside nursing flowsheet.  ICU Vital Signs Last 24 Hrs  T(C): 36.8 (28 Jun 2021 17:31), Max: 36.8 (28 Jun 2021 17:31)  T(F): 98.2 (28 Jun 2021 17:31), Max: 98.2 (28 Jun 2021 17:31)  HR: 83 (28 Jun 2021 17:00) (78 - 84)  BP: 123/73 (28 Jun 2021 15:00) (118/71 - 174/91)  BP(mean): 93 (28 Jun 2021 15:00) (90 - 126)  ABP: 128/49 (28 Jun 2021 17:00) (113/50 - 185/80)  ABP(mean): 73 (28 Jun 2021 17:00) (72 - 120)  RR: 15 (28 Jun 2021 17:00) (12 - 17)  SpO2: 98% (28 Jun 2021 17:00) (97% - 100%)    Adult Advanced Hemodynamics Last 24 Hrs  CVP(mm Hg): 4 (28 Jun 2021 17:00) (3 - 10)  CVP(cm H2O): --  CO: --  CI: --  PA: --  PA(mean): --  PCWP: --  SVR: --  SVRI: --  PVR: --  PVRI: --, ABG - ( 28 Jun 2021 11:36 )  pH, Arterial: 7.35  pH, Blood: x     /  pCO2: 36    /  pO2: 121   / HCO3: 20    / Base Excess: -5.1  /  SaO2: 99.3              Mode: AC/ CMV (Assist Control/ Continuous Mandatory Ventilation)  RR (machine): 12  TV (machine): 600  FiO2: 40  PEEP: 5  ITime: 1  MAP: 9  PIP: 26    Intake and output was reviewed and the fluid balance was calculated  Daily     Daily   I&O's Summary    28 Jun 2021 07:01  -  28 Jun 2021 17:54  --------------------------------------------------------  IN: 581.9 mL / OUT: 1845 mL / NET: -1263.1 mL        All lines and drain sites were assessed  Glycemic [facility]      POCT Blood Glucose.: 116 mg/dL (28 Jun 2021 17:06)    No acute change in focality  Auscultation of the chest reveals equal bs  Abdomen is soft  Extremities are warm and well perfused  Wounds appear clean and unremarkable  Antibiotics are periop    labs  CBC Full  -  ( 28 Jun 2021 11:36 )  WBC Count : 9.71 K/uL  RBC Count : 3.79 M/uL  Hemoglobin : 11.7 g/dL  Hematocrit : 35.7 %  Platelet Count - Automated : 116 K/uL  Mean Cell Volume : 94.2 fl  Mean Cell Hemoglobin : 30.9 pg  Mean Cell Hemoglobin Concentration : 32.8 gm/dL  Auto Neutrophil # : 8.01 K/uL  Auto Lymphocyte # : 0.93 K/uL  Auto Monocyte # : 0.58 K/uL  Auto Eosinophil # : 0.05 K/uL  Auto Basophil # : 0.02 K/uL  Auto Neutrophil % : 82.5 %  Auto Lymphocyte % : 9.6 %  Auto Monocyte % : 6.0 %  Auto Eosinophil % : 0.5 %  Auto Basophil % : 0.2 %    06-28    141  |  111<H>  |  24<H>  ----------------------------<  183<H>  3.9   |  21<L>  |  1.00    Ca    8.3<L>      28 Jun 2021 11:36  Phos  2.9     06-28  Mg     1.7     06-28    TPro  5.4<L>  /  Alb  3.3  /  TBili  0.3  /  DBili  x   /  AST  15  /  ALT  17  /  AlkPhos  54  06-28    PT/INR - ( 28 Jun 2021 11:36 )   PT: 12.3 sec;   INR: 1.03          PTT - ( 28 Jun 2021 11:36 )  PTT:28.5 sec  The current medications were reviewed   MEDICATIONS  (STANDING):  aspirin  chewable 81 milliGRAM(s) Oral daily  BUpivacaine liposome 1.3% Injectable (no eMAR) 20 milliLiter(s) Local Injection once  ceFAZolin  Injectable. 2000 milliGRAM(s) IV Push every 8 hours  chlorhexidine 0.12% Liquid 5 milliLiter(s) Oral Mucosa two times a day  clopidogrel Tablet 75 milliGRAM(s) Oral daily  dexMEDEtomidine Infusion 0.2 MICROgram(s)/kG/Hr (3.93 mL/Hr) IV Continuous <Continuous>  dextrose 50% Injectable 50 milliLiter(s) IV Push every 15 minutes  dextrose 50% Injectable 25 milliLiter(s) IV Push every 15 minutes  entecavir 1 milliGRAM(s) Oral daily  gabapentin 100 milliGRAM(s) Oral daily  heparin   Injectable 5000 Unit(s) SubCutaneous every 8 hours  insulin regular Infusion 1 Unit(s)/Hr (1 mL/Hr) IV Continuous <Continuous>  niCARdipine Infusion 5 mG/Hr (25 mL/Hr) IV Continuous <Continuous>  pantoprazole  Injectable 40 milliGRAM(s) IV Push daily  sodium chloride 0.9%. 1000 milliLiter(s) (10 mL/Hr) IV Continuous <Continuous>  tamsulosin 0.4 milliGRAM(s) Oral at bedtime    MEDICATIONS  (PRN):       PROBLEM LIST/ ASSESSMENT:  HEALTH ISSUES - PROBLEM Dx:      ,   Patient is a 66y old  Male who presents with a chief complaint of CAD (18 Jun 2021 13:26)     s/p cardiac surgery      PROCEDURES:  Minimally invasive direct coronary artery bypass (MIDCAB) with transesophageal echocardiography (YONY) 28-Jun-2021 11:37:39 Robotic MIDCAB (LIMA to LAD) [place] [facility].  66 y/o Cantonese speaking male, current smoker presents with PMH of HTN, HLD, DM, hepatitis B, PVD s/p PTA SFA and DEANNA, CAD s/p PCIs- RCA 2012 @ [place], PCI LAD and LCx 2016 @ [facility]. He presented to cardiologist Dr. Radha Leary with c/o dyspnea and mild chest tightness with exertion occurring on walking greater than 2 blocks or climbing greater than 1 flight of stairs. Symptoms relieved with rest. CCS III. Pt reports chest pain is located on substernal area, non-radiating lasting for a few minutes. He also reports b/l Leg claudication of the thighs with walking a couple of blocks X few months, worse with stairs and relieved with rest. Pt. denies any SOB, palpitation, N/V, LE edema, syncope, PND/orthopnea. Subsequently pt. underwent NST 3/22/21 revealed EF 55 %, moderate amount of ischemia in anterior region on the stress images which was/were reversible in the rest images. 5/26/21 s/p cardiac cath that revealed severe 3 vessel CAD w/ISR. s/p ASHWIN->proxRCA.    Pt presents today for OR.  No changes to current medical condition since last interaction.  No recent fevers, chills, cough, nausea, emesis, rashes.  Pt had both doses of COVID vaccine and card scanned into chart.  No allergies.  Medication list reviewed.  Pt took Plavix yesterday.  Pt took Metoprolol this AM.  Otherwise has been NPO since midnight.  Procedures, risks, expectations, recovery discussed with patient and consent obtained.    COVID vaccine: Moderna 3/18/21     Patient seen in same day holding area; Reports no changes to PMHx or medications since last seen by our team. Denies acute or current SOB, chest pain, palpitation, N/V/D, fever/chills, recent illness, or any other concerning symptoms.    neuro wakes moves all 4, alert following on sbt with plans to extubate soon.          My plan includes :  close hemodynamic, ventilatory and drain monitoring and management per post op routine    Monitor for arrhythmias and monitor parameters for organ perfusion  beta blockade not administered due to hemodynamic instability and bradycardia  monitor neurologic status  Head of the bed should remain elevated to 45 deg .   chest PT and IS will be encouraged  monitor adequacy of oxygenation and ventilation and attempt to wean oxygen  antibiotic regimen will be tailored to the clinical, laboratory and microbiologic data  Nutritional goals will be met using po eventually , ensure adequate caloric intake and montior the same  Stress ulcer and VTE prophylaxis will be achieved    Glycemic control is satisfactory  Electrolytes have been repleted as necessary and wound care has been carried out. Pain control has been achieved.   agressive physical therapy and early mobility and ambulation goals will be met   The family was updated about the course and plan  CRITICAL CARE TIME personally provided by me  in evaluation and management, reassessments, review and interpretation of labs and x-rays, ventilator and hemodynamic management, formulating a plan and coordinating care: ___90____ MIN.  Time does not include procedural time. Time spent was non routine post-operarive caRE and included multiple and repeated evaluations at the bedside  CTICU ATTENDING     					    Keven Webb MD                        	
Patient discussed on morning rounds with Dr. Egan    Operation / Date: 6/28/21 Robotic assisted MIDCAB (LIMA-LAD), EF normal    SUBJECTIVE ASSESSMENT:  66y Male assessed at bedside after transfer from CTICU. Patient states he feels better, pain is better controlled. Denies chest pain, SOB. Is using IS (500 cc). Denies fever, chills, nausea, vomiting.     Vital Signs Last 24 Hrs  T(C): 36.4 (29 Jun 2021 17:33), Max: 36.8 (29 Jun 2021 10:10)  T(F): 97.6 (29 Jun 2021 17:33), Max: 98.2 (29 Jun 2021 10:10)  HR: 100 (29 Jun 2021 16:57) (80 - 108)  BP: 135/71 (29 Jun 2021 16:57) (93/57 - 138/71)  BP(mean): 97 (29 Jun 2021 16:57) (71 - 98)  RR: 18 (29 Jun 2021 16:57) (12 - 20)  SpO2: 94% (29 Jun 2021 16:57) (94% - 100%)  I&O's Detail    28 Jun 2021 07:01  -  29 Jun 2021 07:00  --------------------------------------------------------  IN:    Albumin 5%  - 250 mL: 500 mL    Dexmedetomidine: 14.9 mL    Insulin: 12 mL    IV PiggyBack: 400 mL    NiCARdipine: 252.5 mL    sodium chloride 0.9%: 380 mL  Total IN: 1559.4 mL    OUT:    Chest Tube (mL): 150 mL    Drain (mL): 250 mL    Indwelling Catheter - Urethral (mL): 3010 mL    Nasogastric/Oral tube (mL): 325 mL  Total OUT: 3735 mL    Total NET: -2175.6 mL      29 Jun 2021 07:01  -  29 Jun 2021 20:03  --------------------------------------------------------  IN:    Albumin 5%  - 250 mL: 250 mL    Oral Fluid: 1080 mL    sodium chloride 0.9%: 50 mL  Total IN: 1380 mL    OUT:    Chest Tube (mL): 10 mL    Drain (mL): 70 mL    Indwelling Catheter - Urethral (mL): 225 mL    Voided (mL): 630 mL  Total OUT: 935 mL    Total NET: 445 mL    CHEST TUBE:  No  ELI DRAIN:  Yes  EPICARDIAL WIRES: Yes.  TIE DOWNS: Yes  CARBAJAL: No.    Physical Exam  CONSTITUTIONAL: Well appearing in NAD assessed laying comfortably in bed   NEURO: A&OX3. No focal deficits noted, moving bilateral upper and lower extremities                    CV: RRR, no murmurs, rubs, gallops  RESPIRATORY: Clear to auscultation bilateral posterior lung fields, no wheezes, rales, rhonchi   GI: +BS, NT/ND  MUSKULOSKELETAL: No peripheral edema or calf tenderness. Full strength and ROM bilateral upper and lower extremities   VASCULAR: Bilateral distal pulses 2+  INCISIONS: L mini thoracotomy clean, dry, intact     LABS:                        12.5   9.51  )-----------( 142      ( 29 Jun 2021 13:40 )             38.2       COUMADIN: No: .    PT/INR - ( 29 Jun 2021 02:27 )   PT: 13.0 sec;   INR: 1.09          PTT - ( 29 Jun 2021 02:27 )  PTT:30.2 sec    06-29    140  |  106  |  20  ----------------------------<  174<H>  4.1   |  23  |  1.04    Ca    8.5      29 Jun 2021 13:40  Phos  3.0     06-29  Mg     2.3     06-29    TPro  5.7<L>  /  Alb  3.6  /  TBili  0.4  /  DBili  x   /  AST  16  /  ALT  14  /  AlkPhos  38<L>  06-29    MEDICATIONS  (STANDING):  aspirin  chewable 81 milliGRAM(s) Oral daily  ceFAZolin  Injectable. 2000 milliGRAM(s) IV Push every 8 hours  clopidogrel Tablet 75 milliGRAM(s) Oral daily  dextrose 40% Gel 15 Gram(s) Oral once  dextrose 5%. 1000 milliLiter(s) (50 mL/Hr) IV Continuous <Continuous>  dextrose 5%. 1000 milliLiter(s) (100 mL/Hr) IV Continuous <Continuous>  dextrose 50% Injectable 25 Gram(s) IV Push once  dextrose 50% Injectable 12.5 Gram(s) IV Push once  dextrose 50% Injectable 25 Gram(s) IV Push once  entecavir 1 milliGRAM(s) Oral daily  gabapentin 100 milliGRAM(s) Oral daily  glucagon  Injectable 1 milliGRAM(s) IntraMuscular once  heparin   Injectable 5000 Unit(s) SubCutaneous every 8 hours  insulin glargine Injectable (LANTUS) 10 Unit(s) SubCutaneous at bedtime  insulin lispro (ADMELOG) corrective regimen sliding scale   SubCutaneous Before meals and at bedtime  insulin lispro Injectable (ADMELOG) 3 Unit(s) SubCutaneous three times a day before meals  metoprolol tartrate 25 milliGRAM(s) Oral every 12 hours  pantoprazole    Tablet 40 milliGRAM(s) Oral before breakfast  polyethylene glycol 3350 17 Gram(s) Oral daily  sodium chloride 0.9% lock flush 3 milliLiter(s) IV Push every 8 hours  tamsulosin 0.4 milliGRAM(s) Oral at bedtime    MEDICATIONS  (PRN):  acetaminophen   Tablet .. 650 milliGRAM(s) Oral every 6 hours PRN Mild Pain (1 - 3)  oxyCODONE    IR 5 milliGRAM(s) Oral every 4 hours PRN Moderate Pain (4 - 6)  oxyCODONE    IR 10 milliGRAM(s) Oral every 6 hours PRN Severe Pain (7 - 10)      RADIOLOGY & ADDITIONAL TESTS:    < from: Xray Chest PA/Lat Pre Surgical Testing 2 Views (06.08.21 @ 10:16) >  Findings/  impression: Cardiomegaly, coronary artery calcification. Left basilar focal atelectasis. Mediastinum, unremarkable. Scoliosis, mild..    < end of copied text >  
CTICU  CRITICAL  CARE  attending     Hand off received 					   Pertinent clinical, laboratory, radiographic, hemodynamic, echocardiographic, respiratory data, microbiologic data and chart were reviewed and analyzed frequently throughout the course of the day and night    65 years old male with DM,  HTN, HLD,  hepatitis B, PVD s/p PTA SFA and DEANNA, CAD s/p PCIs- RCA 2012 @ [place], PCI LAD and LCx 2016 @ [facility].   He presented to cardiologist Dr. Radha Leary with c/o dyspnea and mild chest tightness with exertion occurring on walking greater than 2 blocks or climbing greater than 1 flight of stairs.   The symptoms relieved with rest. CCS III. Pt reports chest pain is located on substernal area, non-radiating lasting for a few minutes.   He also reports b/l Leg claudication of the thighs with walking a couple of blocks for a few months, worse with stairs and relieved with rest.   The patient denies  any palpitation, N/V, LE edema, syncope, PND/orthopnea.   Nuclear Stress Test revealed EF 55 %, moderate amount of ischemia in anterior region on the stress images which was/were reversible in the rest images.   Cardiac cath that revealed severe 3 vessel CAD w/ISR. s/p ASHWIN->proxRCA.    S/P CABG.          FAMILY HISTORY:  No pertinent family history in first degree relatives    PAST MEDICAL & SURGICAL HISTORY:  Hypertension  Hyperlipidemia  Hepatitis B  PVD (peripheral vascular disease)  Diabetes mellitus  CAD (coronary artery disease)  History of BPH  H/O eye surgery        14 system review was unremarkable    Vital signs, hemodynamic and respiratory parameters were reviewed from the bedside nursing flow sheet.  ICU Vital Signs Last 24 Hrs  T(C): 36.1 (28 Jun 2021 21:06), Max: 36.8 (28 Jun 2021 17:31)  T(F): 97 (28 Jun 2021 21:06), Max: 98.2 (28 Jun 2021 17:31)  HR: 80 (28 Jun 2021 23:00) (78 - 93)  BP: 120/64 (28 Jun 2021 23:00) (118/71 - 174/91)  BP(mean): 87 (28 Jun 2021 23:00) (87 - 126)  ABP: 136/54 (28 Jun 2021 23:00) (113/50 - 185/80)  ABP(mean): 79 (28 Jun 2021 23:00) (72 - 120)  RR: 15 (28 Jun 2021 23:00) (12 - 21)  SpO2: 98% (28 Jun 2021 23:00) (97% - 100%)    Adult Advanced Hemodynamics Last 24 Hrs  CVP(mm Hg): 6 (28 Jun 2021 23:00) (3 - 12)  CVP(cm H2O): --  CO: --  CI: --  PA: --  PA(mean): --  PCWP: --  SVR: --  SVRI: --  PVR: --  PVRI: --, ABG - ( 28 Jun 2021 20:56 )  pH, Arterial: 7.35  pH, Blood: x     /  pCO2: 40    /  pO2: 116   / HCO3: 22    / Base Excess: -3.3  /  SaO2: 99.4              Mode: AC/ CMV (Assist Control/ Continuous Mandatory Ventilation)  RR (machine): 12  TV (machine): 600  FiO2: 40  PEEP: 5  ITime: 1  MAP: 9  PIP: 26    Intake and output was reviewed and the fluid balance was calculated  Daily     Daily   I&O's Summary    28 Jun 2021 07:01  -  29 Jun 2021 00:03  --------------------------------------------------------  IN: 1139.4 mL / OUT: 2710 mL / NET: -1570.6 mL        All lines and drain sites were assessed      Neuro: No change in the mental status from the baseline. Follows commands. Moves all 4 extremities.  Neck: No JVD.  CVS: S1, S2, No S3.  Lungs: Good air entry bilaterally.  Abd: Soft. No tenderness. + Bowel sounds.  Vascular: + DP/PT.  Extremities: No edema.  Lymphatic: Normal.  Skin: No abnormalities.      labs  CBC Full  -  ( 28 Jun 2021 21:08 )  WBC Count : 10.85 K/uL  RBC Count : 4.19 M/uL  Hemoglobin : 12.8 g/dL  Hematocrit : 38.9 %  Platelet Count - Automated : 139 K/uL  Mean Cell Volume : 92.8 fl  Mean Cell Hemoglobin : 30.5 pg  Mean Cell Hemoglobin Concentration : 32.9 gm/dL  Auto Neutrophil # : x  Auto Lymphocyte # : x  Auto Monocyte # : x  Auto Eosinophil # : x  Auto Basophil # : x  Auto Neutrophil % : x  Auto Lymphocyte % : x  Auto Monocyte % : x  Auto Eosinophil % : x  Auto Basophil % : x    06-28    135  |  106  |  20  ----------------------------<  123<H>  4.0   |  21<L>  |  0.96    Ca    8.2<L>      28 Jun 2021 21:08  Phos  3.9     06-28  Mg     2.2     06-28    TPro  6.0  /  Alb  3.5  /  TBili  0.4  /  DBili  x   /  AST  18  /  ALT  17  /  AlkPhos  49  06-28    PT/INR - ( 28 Jun 2021 21:08 )   PT: 12.2 sec;   INR: 1.02          PTT - ( 28 Jun 2021 21:08 )  PTT:28.0 sec  The current medications were reviewed   MEDICATIONS  (STANDING):  acetaminophen  IVPB .. 1000 milliGRAM(s) IV Intermittent once  aspirin  chewable 81 milliGRAM(s) Oral daily  ceFAZolin  Injectable. 2000 milliGRAM(s) IV Push every 8 hours  clopidogrel Tablet 75 milliGRAM(s) Oral daily  dextrose 50% Injectable 50 milliLiter(s) IV Push every 15 minutes  dextrose 50% Injectable 25 milliLiter(s) IV Push every 15 minutes  entecavir 1 milliGRAM(s) Oral daily  gabapentin 100 milliGRAM(s) Oral daily  heparin   Injectable 5000 Unit(s) SubCutaneous every 8 hours  insulin regular Infusion 1 Unit(s)/Hr (1 mL/Hr) IV Continuous <Continuous>  niCARdipine Infusion 5 mG/Hr (25 mL/Hr) IV Continuous <Continuous>  pantoprazole    Tablet 40 milliGRAM(s) Oral before breakfast  polyethylene glycol 3350 17 Gram(s) Oral daily  sodium chloride 0.9%. 1000 milliLiter(s) (10 mL/Hr) IV Continuous <Continuous>  tamsulosin 0.4 milliGRAM(s) Oral at bedtime    MEDICATIONS  (PRN):  acetaminophen   Tablet .. 650 milliGRAM(s) Oral every 6 hours PRN Mild Pain (1 - 3)  fentaNYL    Injectable 25 MICROGram(s) IV Push every 3 hours PRN Severe Pain (7 - 10)  oxyCODONE    IR 5 milliGRAM(s) Oral every 4 hours PRN Moderate Pain (4 - 6)  oxyCODONE    IR 10 milliGRAM(s) Oral every 6 hours PRN Severe Pain (7 - 10)          66y old  Male with CAD   S/P CABG  Metabolic acidosis.  Hemodynamically stable.  Good oxygenation.  Fair urine out put.        My plan includes :  D/C nicardipine.  Statin and Betablocker.  Dual antiplatelet Rx.  Entecavir for hepatitis B.  Close hemodynamic, ventilatory and drain monitoring and management  Monitor for arrhythmias and monitor parameters for organ perfusion  Monitor neurologic status  Monitor renal function.  Head of the bed should remain elevated to 45 deg .   Chest PT and IS will be encouraged  Monitor adequacy of oxygenation and ventilation and attempt to wean oxygen  Nutritional goals will be met using po eventually , ensure adequate caloric intake and monitor the same  Stress ulcer and VTE prophylaxis will be achieved    Glycemic control is satisfactory  Electrolytes have been repleted as necessary and wound care has been carried out. Pain control has been achieved.   Aggressive physical therapy and early mobility and ambulation goals will be met   The family was updated about the course and plan  CRITICAL CARE TIME SPENT in evaluation and management, reassessments, review and interpretation of labs and x-rays, ventilator and hemodynamic management, formulating a plan and coordinating care: ___30____ MIN.  Time does not include procedural time.  CTICU ATTENDING     					    Troy Meek MD                        	
CTICU  CRITICAL  CARE  attending     Hand off received 					   Pertinent clinical, laboratory, radiographic, hemodynamic, echocardiographic, respiratory data, microbiologic data and chart were reviewed and analyzed frequently throughout the course of the day and night  Patient seen and examined with CTS/ SH attending at bedside  Pt is a 66y , Male, HEALTH ISSUES - PROBLEM Dx:      , FAMILY HISTORY:  No pertinent family history in first degree relatives    PAST MEDICAL & SURGICAL HISTORY:  Hypertension    Hyperlipidemia    Hepatitis B    PVD (peripheral vascular disease)    Diabetes mellitus    CAD (coronary artery disease)    History of BPH    H/O eye surgery      Patient is a 66y old  Male who presents with a chief complaint of CAD (28 Jun 2021 22:00)      14 system review limited by mentation and multiorgan morbidity     Vital signs, hemodynamic and respiratory parameters were reviewed from the bedside nursing flowsheet.  ICU Vital Signs Last 24 Hrs  T(C): 36.8 (29 Jun 2021 10:10), Max: 36.8 (28 Jun 2021 17:31)  T(F): 98.2 (29 Jun 2021 10:10), Max: 98.2 (28 Jun 2021 17:31)  HR: 100 (29 Jun 2021 10:00) (78 - 108)  BP: 117/65 (29 Jun 2021 10:00) (93/57 - 174/91)  BP(mean): 86 (29 Jun 2021 10:00) (71 - 126)  ABP: 117/55 (29 Jun 2021 07:35) (96/48 - 185/80)  ABP(mean): 75 (29 Jun 2021 07:35) (64 - 120)  RR: 17 (29 Jun 2021 10:00) (12 - 21)  SpO2: 97% (29 Jun 2021 10:00) (94% - 100%)    Adult Advanced Hemodynamics Last 24 Hrs  CVP(mm Hg): 3 (29 Jun 2021 10:00) (2 - 12)  CVP(cm H2O): --  CO: --  CI: --  PA: --  PA(mean): --  PCWP: --  SVR: --  SVRI: --  PVR: --  PVRI: --, ABG - ( 29 Jun 2021 02:24 )  pH, Arterial: 7.36  pH, Blood: x     /  pCO2: 40    /  pO2: 103   / HCO3: 23    / Base Excess: -2.7  /  SaO2: 98.3              Mode: AC/ CMV (Assist Control/ Continuous Mandatory Ventilation)  RR (machine): 12  TV (machine): 600  FiO2: 40  PEEP: 5  ITime: 1  MAP: 9  PIP: 26    Intake and output was reviewed and the fluid balance was calculated  Daily     Daily   I&O's Summary    28 Jun 2021 07:01  -  29 Jun 2021 07:00  --------------------------------------------------------  IN: 1559.4 mL / OUT: 3735 mL / NET: -2175.6 mL    29 Jun 2021 07:01  -  29 Jun 2021 11:05  --------------------------------------------------------  IN: 340 mL / OUT: 235 mL / NET: 105 mL        All lines and drain sites were assessed  [facility]      POCT Blood Glucose.: 256 mg/dL (29 Jun 2021 11:00)    No acute change in focality  Auscultation of the chest reveals equal bs  Abdomen is soft  Extremities are warm and well perfused  Wounds appear clean and unremarkable  Antibiotics are periop    labs  CBC Full  -  ( 29 Jun 2021 02:27 )  WBC Count : 8.56 K/uL  RBC Count : 3.67 M/uL  Hemoglobin : 11.4 g/dL  Hematocrit : 34.5 %  Platelet Count - Automated : 124 K/uL  Mean Cell Volume : 94.0 fl  Mean Cell Hemoglobin : 31.1 pg  Mean Cell Hemoglobin Concentration : 33.0 gm/dL  Auto Neutrophil # : x  Auto Lymphocyte # : x  Auto Monocyte # : x  Auto Eosinophil # : x  Auto Basophil # : x  Auto Neutrophil % : x  Auto Lymphocyte % : x  Auto Monocyte % : x  Auto Eosinophil % : x  Auto Basophil % : x    06-29    139  |  109<H>  |  17  ----------------------------<  82  3.8   |  22  |  0.94    Ca    8.0<L>      29 Jun 2021 02:27  Phos  3.0     06-29  Mg     2.3     06-29    TPro  5.7<L>  /  Alb  3.6  /  TBili  0.4  /  DBili  x   /  AST  16  /  ALT  14  /  AlkPhos  38<L>  06-29    PT/INR - ( 29 Jun 2021 02:27 )   PT: 13.0 sec;   INR: 1.09          PTT - ( 29 Jun 2021 02:27 )  PTT:30.2 sec  The current medications were reviewed   MEDICATIONS  (STANDING):  aspirin  chewable 81 milliGRAM(s) Oral daily  ceFAZolin  Injectable. 2000 milliGRAM(s) IV Push every 8 hours  clopidogrel Tablet 75 milliGRAM(s) Oral daily  dexMEDEtomidine Infusion 0.5 MICROgram(s)/kG/Hr (9.81 mL/Hr) IV Continuous <Continuous>  dextrose 40% Gel 15 Gram(s) Oral once  dextrose 5%. 1000 milliLiter(s) (50 mL/Hr) IV Continuous <Continuous>  dextrose 5%. 1000 milliLiter(s) (100 mL/Hr) IV Continuous <Continuous>  dextrose 50% Injectable 25 Gram(s) IV Push once  dextrose 50% Injectable 12.5 Gram(s) IV Push once  dextrose 50% Injectable 25 Gram(s) IV Push once  entecavir 1 milliGRAM(s) Oral daily  gabapentin 100 milliGRAM(s) Oral daily  glucagon  Injectable 1 milliGRAM(s) IntraMuscular once  heparin   Injectable 5000 Unit(s) SubCutaneous every 8 hours  insulin glargine Injectable (LANTUS) 10 Unit(s) SubCutaneous at bedtime  insulin lispro (ADMELOG) corrective regimen sliding scale   SubCutaneous Before meals and at bedtime  insulin lispro Injectable (ADMELOG) 3 Unit(s) SubCutaneous three times a day before meals  niCARdipine Infusion 5 mG/Hr (25 mL/Hr) IV Continuous <Continuous>  pantoprazole    Tablet 40 milliGRAM(s) Oral before breakfast  polyethylene glycol 3350 17 Gram(s) Oral daily  sodium chloride 0.9%. 1000 milliLiter(s) (10 mL/Hr) IV Continuous <Continuous>  tamsulosin 0.4 milliGRAM(s) Oral at bedtime    MEDICATIONS  (PRN):  acetaminophen   Tablet .. 650 milliGRAM(s) Oral every 6 hours PRN Mild Pain (1 - 3)  fentaNYL    Injectable 25 MICROGram(s) IV Push every 3 hours PRN Severe Pain (7 - 10)  oxyCODONE    IR 5 milliGRAM(s) Oral every 4 hours PRN Moderate Pain (4 - 6)  oxyCODONE    IR 10 milliGRAM(s) Oral every 6 hours PRN Severe Pain (7 - 10)       PROBLEM LIST/ ASSESSMENT:  HEALTH ISSUES - PROBLEM Dx:      ,   Patient is a 66y old  Male who presents with a chief complaint of CAD (28 Jun 2021 22:00)     s/p cardiac surgery      midcab pod 1 extubated < 4h post op in the icu     neg 2L    dc chest tube, jessica, garcia    hold off on xarelto , start asa plavix per ctsx    floors ok          My plan includes :  close hemodynamic, ventilatory and drain monitoring and management per post op routine    Monitor for arrhythmias and monitor parameters for organ perfusion  beta blockade not administered due to hemodynamic instability and bradycardia  monitor neurologic status  Head of the bed should remain elevated to 45 deg .   chest PT and IS will be encouraged  monitor adequacy of oxygenation and ventilation and attempt to wean oxygen  antibiotic regimen will be tailored to the clinical, laboratory and microbiologic data  Nutritional goals will be met using po eventually , ensure adequate caloric intake and montior the same  Stress ulcer and VTE prophylaxis will be achieved    Glycemic control is satisfactory  Electrolytes have been repleted as necessary and wound care has been carried out. Pain control has been achieved.   agressive physical therapy and early mobility and ambulation goals will be met   The family was updated about the course and plan  CRITICAL CARE TIME personally provided by me  in evaluation and management, reassessments, review and interpretation of labs and x-rays, ventilator and hemodynamic management, formulating a plan and coordinating care: ___50____ MIN.  Time does not include procedural time. Time spent was non routine post-operarive caRE and included multiple and repeated evaluations at the bedside  CTICU ATTENDING     					    Keven Webb MD                        	
Patient discussed on morning rounds with Dr. Egan      Operation / Date: 6/28/21 robo assisted MIDCAB (LIMA to LAD)    Surgeon: Dr. Egan    Referring Physician: Dr. Radha Leary    SUBJECTIVE ASSESSMENT AND HOSPITAL COURSE:  65 year old M, current smoker, PMHx HTN, HLD, DM, hepatitis B, PVD s/p PTA SFA and DEANNA, CAD s/p PCIs (2012/2016) who presented to cardiologist Dr. Leary with dyspnea and chest tightness with exertion. NST 3/22/21 revealed EF 55%, moderate ischemia in anterior region on stress images. 5/26/21 he underwent cardiac catheterization which revealed severe vessel CAD. ASHWIN was placed to proximal RCA. He was evaluated by Dr. Egan as an outpatient and was deemed to be a good surgical candidate. On 6/28/21 he underwent an uncomplicated MIDCAB (LIMA-LAD), EF 55%. Post operatively he was brought to the CTICU in stable condition and was extubated later on POD0. POD1 chest tube was removed and CXR was stable. Beta blocker was started and he was transferred to [facility]. POD2 patient remained stable without issues. Postoperatively had some evidence of XIMENA, which has resolved. POD3 patient continues to do well, tolerating po diet, moving bowels and ambulating in hallway without need for supplemental oxygen. Patient is medically stable for discharge home today.     Over 35 minutes was spent with the patient reviewing the discharge material including medications, follow up appointments, recovery, concerning symptoms, and how to contact their health care providers if they have questions.    Vital Signs Last 24 Hrs  T(C): 36.1 (01 Jul 2021 04:48), Max: 36.6 (30 Jun 2021 13:40)  T(F): 97 (01 Jul 2021 04:48), Max: 97.8 (30 Jun 2021 13:40)  HR: 90 (01 Jul 2021 08:30) (90 - 112)  BP: 131/74 (01 Jul 2021 08:30) (128/83 - 163/80)  BP(mean): 104 (01 Jul 2021 08:30) (92 - 113)  RR: 18 (01 Jul 2021 08:30) (16 - 20)  SpO2: 94% (01 Jul 2021 08:30) (91% - 94%)    EPICARDIAL WIRES REMOVED: Yes.  TIE DOWNS REMOVED: Yes.    PHYSICAL EXAM:    General: Patient lying comfortably in bed, no acute distress     Neurological: Alert and oriented. No focal neurological deficits     Cardiovascular: S1S2, RRR, no murmurs appreciated on exam     Respiratory: Clear to ausculation bilaterally, no wheeze/rhonchi/rales    Gastrointestinal: + BS, soft, non tender, non distended     Extremities: Warm and well perfused. No edema, no calf tenderness     Vascular: palpable peripheral pulses b/l     Incision Sites: Thoracotomy: clean, open to air, no signs of infection CT site: CDI. garry site CDI. both sutures removed.      LABS:                        12.3   7.19  )-----------( 126      ( 01 Jul 2021 06:31 )             38.0       COUMADIN: No.           07-01    142  |  108  |  19  ----------------------------<  166<H>  4.2   |  25  |  1.01    Ca    9.1      01 Jul 2021 06:31  Mg     2.1     07-01    Discharge CXR:  < from: Xray Chest 1 View-PORTABLE IMMEDIATE (Xray Chest 1 View-PORTABLE IMMEDIATE .) (06.30.21 @ 10:32) >  INTERPRETATION:  Chest, single portable view    HISTORY: Status post cardiac surgery    Comparison: 6/30    IMPRESSION:    Support Devices: Left chest tube removed  Heart: Cardiomegaly is stable  Mediastinum:  Unremarkable  Lungs/Airways: Small left effusion. No pneumothorax.  Bones/Soft tissues: Unremarkable    < end of copied text >

## 2021-07-01 NOTE — DISCHARGE NOTE PROVIDER - HOSPITAL COURSE
65 year old M, current smoker, PMHx HTN, HLD, DM, hepatitis B, PVD s/p PTA SFA and DEANNA, CAD s/p PCIs (2012/2016) who presented to cardiologist Dr. Leary with dyspnea and chest tightness with exertion. NST 3/22/21 revealed EF 55%, moderate ischemia in anterior region on stress images. 5/26/21 he underwent cardiac catheterization which revealed severe vessel CAD. ASHWIN was placed to proximal RCA. He was evaluated by Dr. Egan as an outpatient and was deemed to be a good surgical candidate. On 6/28/21 he underwent an uncomplicated MIDCAB (LIMA-LAD), EF 55%. Post operatively he was brought to the CTICU in stable condition and was extubated later on POD0. POD1 chest tube was removed and CXR was stable. Beta blocker was started and he was transferred to [facility]. POD2 patient remained stable without issues. Postoperatively had some evidence of XIMENA, which has resolved. POD3 patient continues to do well, tolerating po diet, moving bowels and ambulating in hallway without need for supplemental oxygen. Patient is medically stable for discharge home today.    Over 35 minutes was spent with the patient reviewing the discharge material including medications, follow up appointments, recovery, concerning symptoms, and how to contact their health care providers if they have questions    CABG  Aspirin               [ x ] Yes  [  ] Contraindicated, Reason_______________________________  Beta-Blocker     [ x ] Yes  [  ]Contraindicated, Reason_______________________________  Statin                 [ x ] Yes  [  ] Contraindicated, Reason_______________________________

## 2021-07-02 ENCOUNTER — NON-APPOINTMENT (OUTPATIENT)
Age: 66
End: 2021-07-02

## 2021-07-02 RX ORDER — TAMSULOSIN HYDROCHLORIDE 0.4 MG/1
0.4 CAPSULE ORAL
Qty: 30 | Refills: 2 | Status: DISCONTINUED | COMMUNITY
End: 2021-07-02

## 2021-07-02 RX ORDER — METOPROLOL SUCCINATE 100 MG/1
100 TABLET, EXTENDED RELEASE ORAL DAILY
Refills: 0 | Status: DISCONTINUED | COMMUNITY
End: 2021-07-02

## 2021-07-02 RX ORDER — NITROGLYCERIN 0.4 MG/1
0.4 TABLET SUBLINGUAL
Refills: 0 | Status: COMPLETED | COMMUNITY
End: 2021-07-02

## 2021-07-02 RX ORDER — ZOLPIDEM TARTRATE 5 MG/1
5 TABLET, FILM COATED ORAL
Refills: 0 | Status: DISCONTINUED | COMMUNITY
End: 2021-07-02

## 2021-07-02 RX ORDER — RANOLAZINE 1000 MG/1
1000 TABLET, FILM COATED, EXTENDED RELEASE ORAL
Refills: 0 | Status: COMPLETED | COMMUNITY
End: 2021-07-02

## 2021-07-02 RX ORDER — LOSARTAN POTASSIUM 50 MG/1
50 TABLET, FILM COATED ORAL DAILY
Qty: 30 | Refills: 1 | Status: COMPLETED | COMMUNITY
End: 2021-07-02

## 2021-07-02 RX ORDER — ICOSAPENT ETHYL 1000 MG/1
1 CAPSULE ORAL TWICE DAILY
Qty: 120 | Refills: 3 | Status: DISCONTINUED | COMMUNITY
End: 2021-07-02

## 2021-07-03 ENCOUNTER — NON-APPOINTMENT (OUTPATIENT)
Age: 66
End: 2021-07-03

## 2021-07-06 ENCOUNTER — APPOINTMENT (OUTPATIENT)
Dept: CARDIOTHORACIC SURGERY | Facility: CLINIC | Age: 66
End: 2021-07-06
Payer: MEDICARE

## 2021-07-06 ENCOUNTER — OUTPATIENT (OUTPATIENT)
Dept: OUTPATIENT SERVICES | Facility: HOSPITAL | Age: 66
LOS: 1 days | End: 2021-07-06
Payer: MEDICARE

## 2021-07-06 VITALS
HEART RATE: 95 BPM | RESPIRATION RATE: 17 BRPM | OXYGEN SATURATION: 96 % | SYSTOLIC BLOOD PRESSURE: 136 MMHG | TEMPERATURE: 97.4 F | DIASTOLIC BLOOD PRESSURE: 70 MMHG | HEIGHT: 60 IN | WEIGHT: 173 LBS | BODY MASS INDEX: 33.96 KG/M2

## 2021-07-06 DIAGNOSIS — Z98.890 OTHER SPECIFIED POSTPROCEDURAL STATES: Chronic | ICD-10-CM

## 2021-07-06 PROCEDURE — 71046 X-RAY EXAM CHEST 2 VIEWS: CPT | Mod: 26

## 2021-07-06 PROCEDURE — 99024 POSTOP FOLLOW-UP VISIT: CPT

## 2021-07-06 PROCEDURE — 71046 X-RAY EXAM CHEST 2 VIEWS: CPT

## 2021-07-13 DIAGNOSIS — I10 ESSENTIAL (PRIMARY) HYPERTENSION: ICD-10-CM

## 2021-07-13 DIAGNOSIS — Z79.4 LONG TERM (CURRENT) USE OF INSULIN: ICD-10-CM
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
## 2021-07-13 DIAGNOSIS — I25.10 ATHEROSCLEROTIC HEART DISEASE OF NATIVE CORONARY ARTERY WITHOUT ANGINA PECTORIS: ICD-10-CM

## 2021-07-13 DIAGNOSIS — E11.51 TYPE 2 DIABETES MELLITUS WITH DIABETIC PERIPHERAL ANGIOPATHY WITHOUT GANGRENE: ICD-10-CM

## 2021-07-13 DIAGNOSIS — I70.213 ATHEROSCLEROSIS OF NATIVE ARTERIES OF EXTREMITIES WITH INTERMITTENT CLAUDICATION, BILATERAL LEGS: ICD-10-CM

## 2021-07-13 DIAGNOSIS — Z98.62 PERIPHERAL VASCULAR ANGIOPLASTY STATUS: ICD-10-CM

## 2021-07-13 DIAGNOSIS — E78.5 HYPERLIPIDEMIA, UNSPECIFIED: ICD-10-CM
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
## 2021-07-13 DIAGNOSIS — T82.855A STENOSIS OF CORONARY ARTERY STENT, INITIAL ENCOUNTER: ICD-10-CM

## 2021-07-13 DIAGNOSIS — E87.2 ACIDOSIS: ICD-10-CM

## 2021-07-13 DIAGNOSIS — B19.10 UNSPECIFIED VIRAL HEPATITIS B WITHOUT HEPATIC COMA: ICD-10-CM

## 2021-07-13 DIAGNOSIS — Z95.5 PRESENCE OF CORONARY ANGIOPLASTY IMPLANT AND GRAFT: ICD-10-CM

## 2021-07-13 DIAGNOSIS — N17.9 ACUTE KIDNEY FAILURE, UNSPECIFIED: ICD-10-CM

## 2021-07-13 DIAGNOSIS — N40.0 BENIGN PROSTATIC HYPERPLASIA WITHOUT LOWER URINARY TRACT SYMPTOMS: ICD-10-CM

## 2021-07-13 DIAGNOSIS — F17.210 NICOTINE DEPENDENCE, CIGARETTES, UNCOMPLICATED: ICD-10-CM

## 2021-07-23 ENCOUNTER — INPATIENT (INPATIENT)
Facility: HOSPITAL | Age: 66
LOS: 2 days | Discharge: HOME | DRG: 315 | End: 2021-07-26
Attending: THORACIC SURGERY (CARDIOTHORACIC VASCULAR SURGERY) | Admitting: THORACIC SURGERY (CARDIOTHORACIC VASCULAR SURGERY)
Payer: MEDICARE

## 2021-07-23 ENCOUNTER — TRANSCRIPTION ENCOUNTER (OUTPATIENT)
Age: 66
End: 2021-07-23

## 2021-07-23 VITALS
DIASTOLIC BLOOD PRESSURE: 75 MMHG | WEIGHT: 173.06 LBS | HEART RATE: 89 BPM | TEMPERATURE: 98 F | HEIGHT: 67 IN | OXYGEN SATURATION: 97 % | SYSTOLIC BLOOD PRESSURE: 121 MMHG | RESPIRATION RATE: 18 BRPM

## 2021-07-23 DIAGNOSIS — I31.3 PERICARDIAL EFFUSION (NONINFLAMMATORY): ICD-10-CM

## 2021-07-23 DIAGNOSIS — Z95.5 PRESENCE OF CORONARY ANGIOPLASTY IMPLANT AND GRAFT: ICD-10-CM

## 2021-07-23 DIAGNOSIS — I10 ESSENTIAL (PRIMARY) HYPERTENSION: ICD-10-CM

## 2021-07-23 DIAGNOSIS — Z79.82 LONG TERM (CURRENT) USE OF ASPIRIN: ICD-10-CM

## 2021-07-23 DIAGNOSIS — Z79.4 LONG TERM (CURRENT) USE OF INSULIN: ICD-10-CM

## 2021-07-23 DIAGNOSIS — E11.51 TYPE 2 DIABETES MELLITUS WITH DIABETIC PERIPHERAL ANGIOPATHY WITHOUT GANGRENE: ICD-10-CM

## 2021-07-23 DIAGNOSIS — E11.65 TYPE 2 DIABETES MELLITUS WITH HYPERGLYCEMIA: ICD-10-CM

## 2021-07-23 DIAGNOSIS — I25.10 ATHEROSCLEROTIC HEART DISEASE OF NATIVE CORONARY ARTERY WITHOUT ANGINA PECTORIS: ICD-10-CM

## 2021-07-23 DIAGNOSIS — Z98.890 OTHER SPECIFIED POSTPROCEDURAL STATES: Chronic | ICD-10-CM

## 2021-07-23 DIAGNOSIS — B19.10 UNSPECIFIED VIRAL HEPATITIS B WITHOUT HEPATIC COMA: ICD-10-CM

## 2021-07-23 DIAGNOSIS — I31.4 CARDIAC TAMPONADE: ICD-10-CM

## 2021-07-23 LAB
A1C WITH ESTIMATED AVERAGE GLUCOSE RESULT: 7.4 % — HIGH (ref 4–5.6)
ALBUMIN SERPL ELPH-MCNC: 3.8 G/DL — SIGNIFICANT CHANGE UP (ref 3.3–5)
ALP SERPL-CCNC: 79 U/L — SIGNIFICANT CHANGE UP (ref 40–120)
ALT FLD-CCNC: 18 U/L — SIGNIFICANT CHANGE UP (ref 10–45)
ANION GAP SERPL CALC-SCNC: 10 MMOL/L — SIGNIFICANT CHANGE UP (ref 5–17)
APTT BLD: 31.5 SEC — SIGNIFICANT CHANGE UP (ref 27.5–35.5)
AST SERPL-CCNC: 15 U/L — SIGNIFICANT CHANGE UP (ref 10–40)
BASOPHILS # BLD AUTO: 0.04 K/UL — SIGNIFICANT CHANGE UP (ref 0–0.2)
BASOPHILS NFR BLD AUTO: 0.5 % — SIGNIFICANT CHANGE UP (ref 0–2)
BILIRUB SERPL-MCNC: 0.4 MG/DL — SIGNIFICANT CHANGE UP (ref 0.2–1.2)
BLD GP AB SCN SERPL QL: NEGATIVE — SIGNIFICANT CHANGE UP
BUN SERPL-MCNC: 25 MG/DL — HIGH (ref 7–23)
CALCIUM SERPL-MCNC: 9.5 MG/DL — SIGNIFICANT CHANGE UP (ref 8.4–10.5)
CHLORIDE SERPL-SCNC: 103 MMOL/L — SIGNIFICANT CHANGE UP (ref 96–108)
CO2 SERPL-SCNC: 23 MMOL/L — SIGNIFICANT CHANGE UP (ref 22–31)
CREAT SERPL-MCNC: 1.21 MG/DL — SIGNIFICANT CHANGE UP (ref 0.5–1.3)
EOSINOPHIL # BLD AUTO: 0.13 K/UL — SIGNIFICANT CHANGE UP (ref 0–0.5)
EOSINOPHIL NFR BLD AUTO: 1.5 % — SIGNIFICANT CHANGE UP (ref 0–6)
ESTIMATED AVERAGE GLUCOSE: 166 MG/DL — HIGH (ref 68–114)
GLUCOSE BLDC GLUCOMTR-MCNC: 143 MG/DL — HIGH (ref 70–99)
GLUCOSE SERPL-MCNC: 189 MG/DL — HIGH (ref 70–99)
HCT VFR BLD CALC: 34.6 % — LOW (ref 39–50)
HGB BLD-MCNC: 11.2 G/DL — LOW (ref 13–17)
IMM GRANULOCYTES NFR BLD AUTO: 0.5 % — SIGNIFICANT CHANGE UP (ref 0–1.5)
INR BLD: 1.03 — SIGNIFICANT CHANGE UP (ref 0.88–1.16)
LYMPHOCYTES # BLD AUTO: 1.56 K/UL — SIGNIFICANT CHANGE UP (ref 1–3.3)
LYMPHOCYTES # BLD AUTO: 18 % — SIGNIFICANT CHANGE UP (ref 13–44)
MCHC RBC-ENTMCNC: 30.1 PG — SIGNIFICANT CHANGE UP (ref 27–34)
MCHC RBC-ENTMCNC: 32.4 GM/DL — SIGNIFICANT CHANGE UP (ref 32–36)
MCV RBC AUTO: 93 FL — SIGNIFICANT CHANGE UP (ref 80–100)
MONOCYTES # BLD AUTO: 1 K/UL — HIGH (ref 0–0.9)
MONOCYTES NFR BLD AUTO: 11.5 % — SIGNIFICANT CHANGE UP (ref 2–14)
NEUTROPHILS # BLD AUTO: 5.92 K/UL — SIGNIFICANT CHANGE UP (ref 1.8–7.4)
NEUTROPHILS NFR BLD AUTO: 68 % — SIGNIFICANT CHANGE UP (ref 43–77)
NRBC # BLD: 0 /100 WBCS — SIGNIFICANT CHANGE UP (ref 0–0)
PLATELET # BLD AUTO: 174 K/UL — SIGNIFICANT CHANGE UP (ref 150–400)
POTASSIUM SERPL-MCNC: 4.5 MMOL/L — SIGNIFICANT CHANGE UP (ref 3.5–5.3)
POTASSIUM SERPL-SCNC: 4.5 MMOL/L — SIGNIFICANT CHANGE UP (ref 3.5–5.3)
PROT SERPL-MCNC: 7.2 G/DL — SIGNIFICANT CHANGE UP (ref 6–8.3)
PROTHROM AB SERPL-ACNC: 12.3 SEC — SIGNIFICANT CHANGE UP (ref 10.6–13.6)
RBC # BLD: 3.72 M/UL — LOW (ref 4.2–5.8)
RBC # FLD: 12.7 % — SIGNIFICANT CHANGE UP (ref 10.3–14.5)
RH IG SCN BLD-IMP: POSITIVE — SIGNIFICANT CHANGE UP
SARS-COV-2 RNA SPEC QL NAA+PROBE: NEGATIVE — SIGNIFICANT CHANGE UP
SODIUM SERPL-SCNC: 136 MMOL/L — SIGNIFICANT CHANGE UP (ref 135–145)
TROPONIN T SERPL-MCNC: 0.01 NG/ML — SIGNIFICANT CHANGE UP (ref 0–0.01)
WBC # BLD: 8.69 K/UL — SIGNIFICANT CHANGE UP (ref 3.8–10.5)
WBC # FLD AUTO: 8.69 K/UL — SIGNIFICANT CHANGE UP (ref 3.8–10.5)

## 2021-07-23 PROCEDURE — 99285 EMERGENCY DEPT VISIT HI MDM: CPT

## 2021-07-23 PROCEDURE — 99222 1ST HOSP IP/OBS MODERATE 55: CPT

## 2021-07-23 PROCEDURE — 93010 ELECTROCARDIOGRAM REPORT: CPT

## 2021-07-23 PROCEDURE — 33017 PRCRD DRG 6YR+ W/O CGEN CAR: CPT

## 2021-07-23 RX ORDER — DEXTROSE 50 % IN WATER 50 %
25 SYRINGE (ML) INTRAVENOUS ONCE
Refills: 0 | Status: DISCONTINUED | OUTPATIENT
Start: 2021-07-23 | End: 2021-07-26

## 2021-07-23 RX ORDER — CLOPIDOGREL BISULFATE 75 MG/1
75 TABLET, FILM COATED ORAL DAILY
Refills: 0 | Status: DISCONTINUED | OUTPATIENT
Start: 2021-07-24 | End: 2021-07-24

## 2021-07-23 RX ORDER — GABAPENTIN 400 MG/1
100 CAPSULE ORAL DAILY
Refills: 0 | Status: DISCONTINUED | OUTPATIENT
Start: 2021-07-24 | End: 2021-07-26

## 2021-07-23 RX ORDER — GLUCAGON INJECTION, SOLUTION 0.5 MG/.1ML
1 INJECTION, SOLUTION SUBCUTANEOUS ONCE
Refills: 0 | Status: DISCONTINUED | OUTPATIENT
Start: 2021-07-23 | End: 2021-07-26

## 2021-07-23 RX ORDER — HEPARIN SODIUM 5000 [USP'U]/ML
5000 INJECTION INTRAVENOUS; SUBCUTANEOUS EVERY 8 HOURS
Refills: 0 | Status: DISCONTINUED | OUTPATIENT
Start: 2021-07-23 | End: 2021-07-26

## 2021-07-23 RX ORDER — ASPIRIN/CALCIUM CARB/MAGNESIUM 324 MG
81 TABLET ORAL DAILY
Refills: 0 | Status: DISCONTINUED | OUTPATIENT
Start: 2021-07-24 | End: 2021-07-26

## 2021-07-23 RX ORDER — METOPROLOL TARTRATE 50 MG
100 TABLET ORAL DAILY
Refills: 0 | Status: DISCONTINUED | OUTPATIENT
Start: 2021-07-24 | End: 2021-07-26

## 2021-07-23 RX ORDER — ATORVASTATIN CALCIUM 80 MG/1
40 TABLET, FILM COATED ORAL AT BEDTIME
Refills: 0 | Status: DISCONTINUED | OUTPATIENT
Start: 2021-07-23 | End: 2021-07-26

## 2021-07-23 RX ORDER — SODIUM CHLORIDE 9 MG/ML
1000 INJECTION, SOLUTION INTRAVENOUS
Refills: 0 | Status: DISCONTINUED | OUTPATIENT
Start: 2021-07-23 | End: 2021-07-26

## 2021-07-23 RX ORDER — SODIUM CHLORIDE 9 MG/ML
3 INJECTION INTRAMUSCULAR; INTRAVENOUS; SUBCUTANEOUS EVERY 8 HOURS
Refills: 0 | Status: DISCONTINUED | OUTPATIENT
Start: 2021-07-23 | End: 2021-07-26

## 2021-07-23 RX ORDER — DEXTROSE 50 % IN WATER 50 %
12.5 SYRINGE (ML) INTRAVENOUS ONCE
Refills: 0 | Status: DISCONTINUED | OUTPATIENT
Start: 2021-07-23 | End: 2021-07-26

## 2021-07-23 RX ORDER — DEXTROSE 50 % IN WATER 50 %
15 SYRINGE (ML) INTRAVENOUS ONCE
Refills: 0 | Status: DISCONTINUED | OUTPATIENT
Start: 2021-07-23 | End: 2021-07-26

## 2021-07-23 RX ORDER — INSULIN LISPRO 100/ML
VIAL (ML) SUBCUTANEOUS
Refills: 0 | Status: DISCONTINUED | OUTPATIENT
Start: 2021-07-23 | End: 2021-07-26

## 2021-07-23 RX ORDER — PANTOPRAZOLE SODIUM 20 MG/1
40 TABLET, DELAYED RELEASE ORAL
Refills: 0 | Status: DISCONTINUED | OUTPATIENT
Start: 2021-07-23 | End: 2021-07-26

## 2021-07-23 RX ADMIN — SODIUM CHLORIDE 3 MILLILITER(S): 9 INJECTION INTRAMUSCULAR; INTRAVENOUS; SUBCUTANEOUS at 21:54

## 2021-07-23 NOTE — ED ADULT TRIAGE NOTE - CHIEF COMPLAINT QUOTE
BIBEMS from doctors office. c/o chest pain and sob, worse over the last 2 days, sent in for admission and possible drainage of pericardial effusion.

## 2021-07-23 NOTE — H&P ADULT - HISTORY OF PRESENT ILLNESS
65 y/o M with a PMHx of HTN, uncontrolled DM (A1c pending, on insulin at home) who complains of 2 days of CP, SOB and fatigue with ambulation. Pt went to his outpatient cardiologist, Dr. Radha Leary, who completed an ECHO in office showing a circumferential moderate pericardial effusion with evidence of tamponade physiology. Patient transferred to [facility] for planned IR drainage of pericardial effusion. CT surgery consulted in the ED with plans to admit patient post-procedure. In ED, pt vitals stable, no CP or SOB. Denies any CP, palpitations, SOB, wheezing, abd pain, n/v/d/c, fevers or chills.    ICU Vital Signs Last 24 Hrs  T(C): 36.7 (23 Jul 2021 20:20), Max: 36.7 (23 Jul 2021 20:20)  T(F): 98.1 (23 Jul 2021 20:20), Max: 98.1 (23 Jul 2021 20:20)  HR: 91 (23 Jul 2021 20:20) (89 - 104)  BP: 133/71 (23 Jul 2021 20:20) (121/75 - 137/72)  BP(mean): --  ABP: --  ABP(mean): --  RR: 16 (23 Jul 2021 20:20) (16 - 18)  SpO2: 100% (23 Jul 2021 20:20) (97% - 100%)   67 y/o M with a PMHx of HTN, uncontrolled DM (A1c pending, on insulin at home), hepatitis B, PVD, CAD s/p PCIs (2012/2016) who complains of 2 days of CP, SOB and fatigue with ambulation. Pt went to his outpatient cardiologist, Dr. Radha Leary, who completed an ECHO in office showing a circumferential moderate pericardial effusion with evidence of tamponade physiology. Patient transferred to [facility] for planned IR drainage of pericardial effusion. CT surgery consulted in the ED with plans to admit patient post-procedure. In ED, pt vitals stable, no CP or SOB. Denies any CP, palpitations, SOB, wheezing, abd pain, n/v/d/c, fevers or chills.    ICU Vital Signs Last 24 Hrs  T(C): 36.7 (23 Jul 2021 20:20), Max: 36.7 (23 Jul 2021 20:20)  T(F): 98.1 (23 Jul 2021 20:20), Max: 98.1 (23 Jul 2021 20:20)  HR: 91 (23 Jul 2021 20:20) (89 - 104)  BP: 133/71 (23 Jul 2021 20:20) (121/75 - 137/72)  BP(mean): --  ABP: --  ABP(mean): --  RR: 16 (23 Jul 2021 20:20) (16 - 18)  SpO2: 100% (23 Jul 2021 20:20) (97% - 100%)

## 2021-07-23 NOTE — ED ADULT NURSE REASSESSMENT NOTE - NS ED NURSE REASSESS COMMENT FT1
Pt received back from IR. Pt with left chest ASHU drain with danyelle red blood drainage and full ASHU. Active oozing of serosang fluid from incision site. Pt denies any CP ro SOB. Cardiac monitor in place.

## 2021-07-23 NOTE — ED PROVIDER NOTE - PHYSICAL EXAMINATION
Constitutional: Well appearing, awake, alert, oriented to person, place, time/situation and in no apparent distress.  ENMT: Airway patent. Normal MM  Eyes: Clear bilaterally  Cardiac: Normal rate, regular rhythm.  Heart sounds S1, S2.  No murmurs, rubs or gallops. No JVD or LE edema.  Respiratory: Breaths sounds equal and clear b/l. No increased WOB, tachypnea, hypoxia, or accessory mm use. Pt speaks in full sentences.   Gastrointestinal: Abd soft, NT, ND, NABS. No guarding, rebound, or rigidity. No pulsatile abdominal masses. No organomegaly appreciated.   Musculoskeletal: Range of motion is not limited. no calf ttp  Neuro: Alert and oriented x 3, face symmetric and speech fluent. Strength 5/5 x 4 ext and symmetric, nml gross motor movement, nml gait. No focal deficits noted.  Skin: Skin normal color for race, warm, dry and intact. No evidence of rash.  Psych: Alert and oriented to person, place, time/situation. normal mood and affect. no apparent risk to self or others.

## 2021-07-23 NOTE — ED ADULT NURSE NOTE - PMH
CAD (coronary artery disease)    Diabetes mellitus    Hepatitis B    History of BPH    Hyperlipidemia    Hypertension    PVD (peripheral vascular disease)

## 2021-07-23 NOTE — H&P ADULT - NSHPPHYSICALEXAM_GEN_ALL_CORE
PHYSICAL EXAM:  General: well appearing sitting in bed in NAD   Neurological: AOx3. Motor skills grossly intact  Cardiovascular: Normal S1/S2. Regular rate/rhythm. No murmurs  Respiratory: Lungs CTA bilaterally. No wheezing or rales  Gastrointestinal: +BS in all 4 quadrants. Non-distended. Soft. Non-tender  Extremities: Strength 5/5 b/l upper/lower extremities. Sensation grossly intact upper/lower extremities. Trace LE edema b/l. No calf tenderness.  Vascular: Radial 2+bilaterally, DP 2+ b/l  Incision Sites: left inframammary thoracotomy incision well healed, no erythema, purulence or ecchymosis.

## 2021-07-23 NOTE — ED PROVIDER NOTE - INTERPRETER'S NAME
Patient called complained of symptoms of heavy menstrual bleeding and prolonged menses well current blood pressure pill Loestrin was being used. Suggested that we switch her to a different product such as Demulen 1/35. A prescription was sent to her pharmacy. Attempted phone contact with patient but left message on her answering machine as she was unavailable.   Hon Eloy FOSTER

## 2021-07-23 NOTE — ED ADULT NURSE NOTE - DRUG PRE-SCREENING (DAST -1)
Addended byAnnamaria Aguilera on: 2/11/2019 09:07 AM 
 
 Modules accepted: Orders
Statement Selected

## 2021-07-23 NOTE — ED PROVIDER NOTE - OBJECTIVE STATEMENT
Pt referred to the ED s/p outpt echo showed pericardial effusion w/ echocardiographic evidence of tamponade. Pt is Mandarin-speaking, and on my exam requests family member at bedside translate.    Pt w/ PMHx of HTN, uncontrolled DM (A1c pending, on insulin at home), hepatitis B, PVD, CAD s/p PCIs (2012/2016), s/p cardiac cath 5/26/21 which revealed severe vessel CAD. ASHWIN was placed to proximal RCA. On 6/28/21 he underwent an uncomplicated MIDCAB (LIMA-LAD), EF 55%, w/ Dr TURNER Egan. Pt presented to his outpatient cardiologist, Dr. Radha Leary, c/o CP, SOB and fatigue with ambulation. An ECHO in office showing a circumferential moderate pericardial effusion with echocardiographic evidence of tamponade physiology. Patient referred to [facility] for planned IR drainage of pericardial effusion and admission to CT surgery. Pt reports int L sided CP, pressure like, since the surgery. Pt states the sx are worse when laying flat. He states the pain sometimes lasts for seconds. He reports BERTHA, ALLRED.

## 2021-07-23 NOTE — ED PROVIDER NOTE - PROGRESS NOTE DETAILS
Pt for stat IR drainage. Admit to CTS, Dr TURNER Egan. EKG's reviewed w/ Dr Egan, changes c/w pericarditis w/ effusion, similar to prior output. Pt for stat IR drainage. Admit to CTS, Dr TURNER Egan. EKG's reviewed w/ Dr Egan, changes c/w pericarditis w/ effusion, similar to prior output EKG's. No physiologic evidence of tamponade at this time. Hemodynamically stable

## 2021-07-23 NOTE — ED PROVIDER NOTE - CLINICAL SUMMARY MEDICAL DECISION MAKING FREE TEXT BOX
Pt referred to the ED for outpt echo showing pericardial effusion w/ echo evidence of tamponade. No physiologic evidence of tamponade at this time. No resp distress. EKG w/ mild diffuse CASPER w/o reciprocal changes, Qs. Likely 2/2 effusion w/ pericarditis. EKG's sent to Dr Egan, reviewed, c/w prior. Pt for stat IR drainage, followed by admission to CTS for further tx.

## 2021-07-23 NOTE — H&P ADULT - NSHPREVIEWOFSYSTEMS_GEN_ALL_CORE
Review of Systems  CONSTITUTIONAL:  Denies Fevers / chills, sweats, fatigue, weight loss, weight gain                                      NEURO:  +fatigue, Denies changes in sensation, seizures, syncope, confusion                                                                            EYES:  Denies Blurry vision, discharge, pain, loss of vision                                                                                    ENMT:  Denies Difficulty hearing, vertigo, dysphagia, epistaxis, recent dental work                                       CV:  Denies Chest pain, palpitations, ALLRED, orthopnea                                                                                          RESPIRATORY:  +ALLRED, +SOB Denies Wheezing, cough / sputum, hemoptysis                                                                GI:  Denies Nausea, vomiting, diarrhea, constipation, melena, difficulty swallowing                                               : Denies Hematuria, dysuria, urgency, incontinence                                                                                         MUSKULOSKELETAL:  Denies arthritis, joint swelling, muscle weakness                                                             SKIN/BREAST:  Denies rash, itching, hair loss, masses                                                                                            PSYCH:  Denies depression, anxiety, suicidal ideation                                                                                               HEME/LYMPH:  Denies bruises easily, enlarged lymph nodes, tender lymph nodes                                        ENDOCRINE:  Denies cold intolerance, heat intolerance, polydipsia

## 2021-07-23 NOTE — ED ADULT NURSE NOTE - OBJECTIVE STATEMENT
Pt presents to ED per cards for pericardial effusion w/ evidence of tamponade. pt reports not feeling well today, +SOB. EKG in progress, pt on cardiac monitor. L chest wall scar noted, pt states a cardiac bypass was attempted x2 weeks ago. AAOX3, R 18g PIV placed, labs collected

## 2021-07-23 NOTE — H&P ADULT - ASSESSMENT
67 y/o M with a PMHx of HTN, uncontrolled DM (A1c pending, on insulin at home) who complains of 2 days of CP, SOB and fatigue with ambulation. Pt went to his outpatient cardiologist, Dr. Radha Leary, who completed an ECHO in office showing a circumferential moderate pericardial effusion with evidence of tamponade physiology. Patient transferred to [facility] for planned IR drainage of pericardial effusion. CT surgery consulted in the ED with plans to admit patient post-procedure. In ED, pt vitals stable, no CP or SOB. Pending pericardial drain placement at this time with IR.     Plan:    Neurovascular:   -No pain at this time     Cardiovascular:   -s/p MIDCAB in June 2021    -continue with BB, ASA, Plavix, statin   -cardiac tamponade this admission pending drainage with IR at this time   -Hemodynamically stable.   -Monitor: BP, HR, tele    Respiratory:   -Oxygenating well on room air  -Encourage continued use of IS 10x/hr and frequent ambulation  -CXR: no acute pathology     GI:  -GI PPX: pantoprazole 40mg daily   -PO Diet: DASH/TLC/consistent carbs   -Bowel Regimen:  none needed at this time     Renal / :  -Continue to monitor renal function: BUN/Cr 25/1.2  -Monitor I/O's daily     Endocrine:    -No hx of thyroid dx  -A1c: 7.9 last admission, pending repeat A1c     -started on RISS here, follow up glucose readings closely   -TSH: pending     Hematologic:  -CBC: H/H- 11/34   -Coagulation Panel.    ID:  -Temperature: afebrile   -CBC: WBC- 8.6   -Continue to observe for SIRS/Sepsis Syndrome.    Prophylaxis:  -DVT prophylaxis with 5000 SubQ Heparin q8h.  -Continue with SCD's b/l while patient is at rest     Disposition:  -pericardial drain management    65 y/o M with a PMHx of HTN, uncontrolled DM (A1c pending, on insulin at home), hepatitis B, PVD, CAD s/p PCIs (2012/2016) who complains of 2 days of CP, SOB and fatigue with ambulation. Pt went to his outpatient cardiologist, Dr. Radha Leary, who completed an ECHO in office showing a circumferential moderate pericardial effusion with evidence of tamponade physiology. Patient transferred to [facility] for planned IR drainage of pericardial effusion. CT surgery consulted in the ED with plans to admit patient post-procedure. In ED, pt vitals stable, no CP or SOB. Pending pericardial drain placement at this time with IR.     Plan:    Neurovascular:   -No pain at this time     Cardiovascular:   -s/p MIDCAB in June 2021    -continue with BB, ASA, Plavix, statin   -cardiac tamponade this admission pending drainage with IR at this time   -Hemodynamically stable.   -Monitor: BP, HR, tele    Respiratory:   -Oxygenating well on room air  -Encourage continued use of IS 10x/hr and frequent ambulation  -CXR: no acute pathology     GI:  -GI PPX: pantoprazole 40mg daily   -PO Diet: DASH/TLC/consistent carbs   -Bowel Regimen:  none needed at this time     Renal / :  -Continue to monitor renal function: BUN/Cr 25/1.2  -Monitor I/O's daily     Endocrine:    -No hx of thyroid dx  -A1c: 7.9 last admission, pending repeat A1c     -started on RISS here, follow up glucose readings closely   -TSH: pending     Hematologic:  -CBC: H/H- 11/34   -Coagulation Panel.    ID:  -Temperature: afebrile   -CBC: WBC- 8.6   -Continue to observe for SIRS/Sepsis Syndrome.    Prophylaxis:  -DVT prophylaxis with 5000 SubQ Heparin q8h.  -Continue with SCD's b/l while patient is at rest     Disposition:  -pericardial drain management

## 2021-07-24 LAB
ANION GAP SERPL CALC-SCNC: 6 MMOL/L — SIGNIFICANT CHANGE UP (ref 5–17)
APTT BLD: 26.4 SEC — LOW (ref 27.5–35.5)
BUN SERPL-MCNC: 18 MG/DL — SIGNIFICANT CHANGE UP (ref 7–23)
CALCIUM SERPL-MCNC: 9 MG/DL — SIGNIFICANT CHANGE UP (ref 8.4–10.5)
CHLORIDE SERPL-SCNC: 106 MMOL/L — SIGNIFICANT CHANGE UP (ref 96–108)
CO2 SERPL-SCNC: 24 MMOL/L — SIGNIFICANT CHANGE UP (ref 22–31)
COVID-19 SPIKE DOMAIN AB INTERP: POSITIVE
COVID-19 SPIKE DOMAIN ANTIBODY RESULT: >250 U/ML — HIGH
CREAT SERPL-MCNC: 1.08 MG/DL — SIGNIFICANT CHANGE UP (ref 0.5–1.3)
GLUCOSE BLDC GLUCOMTR-MCNC: 122 MG/DL — HIGH (ref 70–99)
GLUCOSE BLDC GLUCOMTR-MCNC: 136 MG/DL — HIGH (ref 70–99)
GLUCOSE BLDC GLUCOMTR-MCNC: 211 MG/DL — HIGH (ref 70–99)
GLUCOSE BLDC GLUCOMTR-MCNC: 213 MG/DL — HIGH (ref 70–99)
GLUCOSE BLDC GLUCOMTR-MCNC: 92 MG/DL — SIGNIFICANT CHANGE UP (ref 70–99)
GLUCOSE SERPL-MCNC: 82 MG/DL — SIGNIFICANT CHANGE UP (ref 70–99)
GRAM STN FLD: SIGNIFICANT CHANGE UP
HCT VFR BLD CALC: 35.6 % — LOW (ref 39–50)
HCV AB S/CO SERPL IA: 0.03 S/CO — SIGNIFICANT CHANGE UP
HCV AB SERPL-IMP: SIGNIFICANT CHANGE UP
HGB BLD-MCNC: 10.8 G/DL — LOW (ref 13–17)
INR BLD: 1.02 — SIGNIFICANT CHANGE UP (ref 0.88–1.16)
MAGNESIUM SERPL-MCNC: 1.9 MG/DL — SIGNIFICANT CHANGE UP (ref 1.6–2.6)
MCHC RBC-ENTMCNC: 29.5 PG — SIGNIFICANT CHANGE UP (ref 27–34)
MCHC RBC-ENTMCNC: 30.3 GM/DL — LOW (ref 32–36)
MCV RBC AUTO: 97.3 FL — SIGNIFICANT CHANGE UP (ref 80–100)
NRBC # BLD: 0 /100 WBCS — SIGNIFICANT CHANGE UP (ref 0–0)
PLATELET # BLD AUTO: 161 K/UL — SIGNIFICANT CHANGE UP (ref 150–400)
POTASSIUM SERPL-MCNC: 4.1 MMOL/L — SIGNIFICANT CHANGE UP (ref 3.5–5.3)
POTASSIUM SERPL-SCNC: 4.1 MMOL/L — SIGNIFICANT CHANGE UP (ref 3.5–5.3)
PROTHROM AB SERPL-ACNC: 12.2 SEC — SIGNIFICANT CHANGE UP (ref 10.6–13.6)
RBC # BLD: 3.66 M/UL — LOW (ref 4.2–5.8)
RBC # FLD: 12.5 % — SIGNIFICANT CHANGE UP (ref 10.3–14.5)
SARS-COV-2 IGG+IGM SERPL QL IA: >250 U/ML — HIGH
SARS-COV-2 IGG+IGM SERPL QL IA: POSITIVE
SODIUM SERPL-SCNC: 136 MMOL/L — SIGNIFICANT CHANGE UP (ref 135–145)
SPECIMEN SOURCE: SIGNIFICANT CHANGE UP
TSH SERPL-MCNC: 1.13 UIU/ML — SIGNIFICANT CHANGE UP (ref 0.27–4.2)
WBC # BLD: 6.45 K/UL — SIGNIFICANT CHANGE UP (ref 3.8–10.5)
WBC # FLD AUTO: 6.45 K/UL — SIGNIFICANT CHANGE UP (ref 3.8–10.5)

## 2021-07-24 PROCEDURE — 71045 X-RAY EXAM CHEST 1 VIEW: CPT | Mod: 26

## 2021-07-24 RX ORDER — INSULIN LISPRO 100/ML
3 VIAL (ML) SUBCUTANEOUS
Refills: 0 | Status: DISCONTINUED | OUTPATIENT
Start: 2021-07-24 | End: 2021-07-26

## 2021-07-24 RX ORDER — ACETAMINOPHEN 500 MG
650 TABLET ORAL EVERY 6 HOURS
Refills: 0 | Status: DISCONTINUED | OUTPATIENT
Start: 2021-07-24 | End: 2021-07-26

## 2021-07-24 RX ORDER — INSULIN GLARGINE 100 [IU]/ML
10 INJECTION, SOLUTION SUBCUTANEOUS AT BEDTIME
Refills: 0 | Status: DISCONTINUED | OUTPATIENT
Start: 2021-07-24 | End: 2021-07-26

## 2021-07-24 RX ADMIN — Medication 3 UNIT(S): at 18:38

## 2021-07-24 RX ADMIN — Medication 4: at 11:43

## 2021-07-24 RX ADMIN — ATORVASTATIN CALCIUM 40 MILLIGRAM(S): 80 TABLET, FILM COATED ORAL at 21:35

## 2021-07-24 RX ADMIN — ATORVASTATIN CALCIUM 40 MILLIGRAM(S): 80 TABLET, FILM COATED ORAL at 00:03

## 2021-07-24 RX ADMIN — SODIUM CHLORIDE 3 MILLILITER(S): 9 INJECTION INTRAMUSCULAR; INTRAVENOUS; SUBCUTANEOUS at 14:05

## 2021-07-24 RX ADMIN — HEPARIN SODIUM 5000 UNIT(S): 5000 INJECTION INTRAVENOUS; SUBCUTANEOUS at 07:07

## 2021-07-24 RX ADMIN — SODIUM CHLORIDE 3 MILLILITER(S): 9 INJECTION INTRAMUSCULAR; INTRAVENOUS; SUBCUTANEOUS at 05:45

## 2021-07-24 RX ADMIN — Medication 100 MILLIGRAM(S): at 07:55

## 2021-07-24 RX ADMIN — SODIUM CHLORIDE 3 MILLILITER(S): 9 INJECTION INTRAMUSCULAR; INTRAVENOUS; SUBCUTANEOUS at 21:37

## 2021-07-24 RX ADMIN — HEPARIN SODIUM 5000 UNIT(S): 5000 INJECTION INTRAVENOUS; SUBCUTANEOUS at 00:02

## 2021-07-24 RX ADMIN — GABAPENTIN 100 MILLIGRAM(S): 400 CAPSULE ORAL at 11:43

## 2021-07-24 RX ADMIN — INSULIN GLARGINE 10 UNIT(S): 100 INJECTION, SOLUTION SUBCUTANEOUS at 21:37

## 2021-07-24 RX ADMIN — Medication 81 MILLIGRAM(S): at 11:43

## 2021-07-24 RX ADMIN — Medication 4: at 21:35

## 2021-07-24 RX ADMIN — PANTOPRAZOLE SODIUM 40 MILLIGRAM(S): 20 TABLET, DELAYED RELEASE ORAL at 07:07

## 2021-07-24 RX ADMIN — HEPARIN SODIUM 5000 UNIT(S): 5000 INJECTION INTRAVENOUS; SUBCUTANEOUS at 13:58

## 2021-07-24 RX ADMIN — HEPARIN SODIUM 5000 UNIT(S): 5000 INJECTION INTRAVENOUS; SUBCUTANEOUS at 21:35

## 2021-07-24 NOTE — PROGRESS NOTE ADULT - ASSESSMENT
A/P:    66 y.o M, Cantonese speaking, PMHx HTN, T2DM (A1c 7.4), hepatitis B, PVD, CAD (s/p PCIs in 2012/2016), who underwent an uncomplicated MIDCAB with Dr. Egan on 6/28/21 and was ultimately discharged home on POD3. On 7/23 he presented to his outpatient cardiologist (Dr. Radha Leary) complaining of 2 days of chest pain, SOB, and fatigue. An outpatient echocardiogram showed a moderate circumferential pericardial effusion with evidence of tamponade physiology. He was transferred to [facility] for evaluation and underwent IR drainage of pericardial effusion (200 cc removed), pericardial drain left in place. 7/24 drain remains in place, patient remains stable.     Neurovascular:   - No delirium. Pain well controlled with current regimen.  -Continue tylenol PRN  - Continue gabapentin 100 mg daily     Cardiovascular:   - S/p MIDCAB 6/28, represented with circumfrential pericardial effusion  - S/p IR drainage 7/23 (200 cc), pericardial drain remains in place, 20 cc overnight  - Plan for drain removal and repeat TTE on 7/25  -Hemodynamically stable. HR controlled ()  - Hx of HTN, BP controlled (116//81), continue metoprolol  mg daily   - CAD s/p MIDCAB: discontinued plavix after pericardial effusion, last PCI 2016, continue ASA, continue atorvastatin 10 mg daily     Respiratory:   - 02 Sat = 96% on RA.  -If on oxygen wean to RA from for O2 Sat > 93%.  -Encourage C+DB and Use of IS 10x / hr while awake.  -CXR without obvious pleural effusion    GI:   - Stable.  -Continue GI PPX with protonix  -PO Diet.    Renal / :  - BUN/Cr stable at 18/1.08  -Continue to monitor renal function.  -Monitor I/O's.  - Replete electrolytes PRN    Endocrine:    -A1c 7.4, known hx DM, on insulin at home, continue MISS and start insulin as needed   -TSH 1.130, no known hx of thyroid disease     Hematologic:  -H/H stable at 10.8/35.6 with no obvious signs of bleeding  -Coagulation Panel.    ID:  -Pt remains afebrile with no elevation in WBC or signs of infection  -Continue to monitor CBC  -Observe for SIRS/Sepsis Syndrome.    Prophylaxis:  -DVT prophylaxis with 5000 SubQ Heparin q8h.  -SCD's    Disposition:  -Home when medically appropriate.   A/P:    66 y.o M, Cantonese speaking, PMHx HTN, T2DM (A1c 7.4), hepatitis B, PVD, CAD (s/p PCIs in 2012/2016), who underwent an uncomplicated MIDCAB with Dr. Egan on 6/28/21 and was ultimately discharged home on POD3. On 7/23 he presented to his outpatient cardiologist (Dr. Radha Leary) complaining of 2 days of chest pain, SOB, and fatigue. An outpatient echocardiogram showed a moderate circumferential pericardial effusion with evidence of tamponade physiology. He was transferred to [facility] for evaluation and underwent IR drainage of pericardial effusion (200 cc removed), pericardial drain left in place. 7/24 drain remains in place, patient remains stable.     Neurovascular:   - No delirium. Pain well controlled with current regimen.  -Continue tylenol PRN  - Continue gabapentin 100 mg daily     Cardiovascular:   - S/p MIDCAB 6/28, represented with circumfrential pericardial effusion  - S/p IR drainage 7/23 (200 cc), pericardial drain remains in place, 20 cc overnight  - Plan for drain removal and repeat TTE on 7/25  -Hemodynamically stable. HR controlled ()  - Hx of HTN, BP controlled (116//81), continue metoprolol  mg daily   - CAD s/p MIDCAB: discontinued plavix after pericardial effusion, last PCI 2016, continue ASA, continue atorvastatin 10 mg daily     Respiratory:   - 02 Sat = 96% on RA.  -If on oxygen wean to RA from for O2 Sat > 93%.  -Encourage C+DB and Use of IS 10x / hr while awake.  -CXR without obvious pleural effusion    GI:   - Stable.  -Continue GI PPX with protonix  -PO Diet.    Renal / :  - BUN/Cr stable at 18/1.08  -Continue to monitor renal function.  -Monitor I/O's.  - Replete electrolytes PRN    Endocrine:    -A1c 7.4, known hx DM, on insulin at home, continue MISS, lantus 10, lispro 3  -TSH 1.130, no known hx of thyroid disease     Hematologic:  -H/H stable at 10.8/35.6 with no obvious signs of bleeding  -Coagulation Panel.    ID:  -Pt remains afebrile with no elevation in WBC or signs of infection  -Continue to monitor CBC  -Observe for SIRS/Sepsis Syndrome.    Prophylaxis:  -DVT prophylaxis with 5000 SubQ Heparin q8h.  -SCD's    Disposition:  -Home when medically appropriate.

## 2021-07-24 NOTE — PROGRESS NOTE ADULT - SUBJECTIVE AND OBJECTIVE BOX
Patient discussed on morning rounds with Dr. Egan    Operation / Date: 6/28/21 MIDCAB  7/23 Readmit with pericardial effusion s/p IR drain, 200 cc    SUBJECTIVE ASSESSMENT:  66y Male assessed at bedside with help of daughter to interpret (Cantonese speaking). Patient states he feels better today. Has some discomfort where the pericardial drain is but denies significant pain. Denies SOB, says his breathing is better today than it has been for the past few days. Also notes that prior to readmission he was having nausea with food consumption and that has resolved. He is using his IS (1000 cc) and having regular BMs.    Vital Signs Last 24 Hrs  T(C): 36.7 (24 Jul 2021 14:16), Max: 36.7 (23 Jul 2021 20:20)  T(F): 98.1 (24 Jul 2021 14:16), Max: 98.1 (23 Jul 2021 20:20)  HR: 89 (24 Jul 2021 14:02) (89 - 120)  BP: 106/64 (24 Jul 2021 14:02) (102/69 - 138/81)  BP(mean): 93 (24 Jul 2021 05:59) (85 - 102)  RR: 16 (24 Jul 2021 14:02) (15 - 18)  SpO2: 99% (24 Jul 2021 14:02) (97% - 100%)  I&O's Detail    23 Jul 2021 07:01  -  24 Jul 2021 07:00  --------------------------------------------------------  IN:  Total IN: 0 mL    OUT:    Voided (mL): 750 mL  Total OUT: 750 mL    Total NET: -750 mL    24 Jul 2021 07:01  -  24 Jul 2021 14:53  --------------------------------------------------------  IN:    Oral Fluid: 180 mL  Total IN: 180 mL    OUT:    Voided (mL): 300 mL  Total OUT: 300 mL    Total NET: -120 mL          CHEST TUBE:  Yes/No. AIR LEAKS: Yes/No. Suction / H2O SEAL.   ELI DRAIN:  Yes/No.  EPICARDIAL WIRES: Yes/No.  TIE DOWNS: Yes/No.  CARBAJAL: Yes/No.    PHYSICAL EXAM:    General:     Neurological:    Cardiovascular:    Respiratory:    Gastrointestinal:    Extremities:    Vascular:    Incision Sites:    LABS:                        10.8   6.45  )-----------( 161      ( 24 Jul 2021 06:49 )             35.6       COUMADIN:  Yes/No. REASON: .    PT/INR - ( 24 Jul 2021 06:49 )   PT: 12.2 sec;   INR: 1.02          PTT - ( 24 Jul 2021 06:49 )  PTT:26.4 sec    07-24    136  |  106  |  18  ----------------------------<  82  4.1   |  24  |  1.08    Ca    9.0      24 Jul 2021 06:49  Mg     1.9     07-24    TPro  7.2  /  Alb  3.8  /  TBili  0.4  /  DBili  x   /  AST  15  /  ALT  18  /  AlkPhos  79  07-23          MEDICATIONS  (STANDING):  aspirin enteric coated 81 milliGRAM(s) Oral daily  atorvastatin 40 milliGRAM(s) Oral at bedtime  dextrose 40% Gel 15 Gram(s) Oral once  dextrose 5%. 1000 milliLiter(s) (50 mL/Hr) IV Continuous <Continuous>  dextrose 5%. 1000 milliLiter(s) (100 mL/Hr) IV Continuous <Continuous>  dextrose 50% Injectable 25 Gram(s) IV Push once  dextrose 50% Injectable 12.5 Gram(s) IV Push once  dextrose 50% Injectable 25 Gram(s) IV Push once  gabapentin 100 milliGRAM(s) Oral daily  glucagon  Injectable 1 milliGRAM(s) IntraMuscular once  heparin   Injectable 5000 Unit(s) SubCutaneous every 8 hours  insulin lispro (ADMELOG) corrective regimen sliding scale   SubCutaneous Before meals and at bedtime  metoprolol succinate  milliGRAM(s) Oral daily  pantoprazole    Tablet 40 milliGRAM(s) Oral before breakfast  sodium chloride 0.9% lock flush 3 milliLiter(s) IV Push every 8 hours    MEDICATIONS  (PRN):        RADIOLOGY & ADDITIONAL TESTS:     Patient discussed on morning rounds with Dr. Egan    Operation / Date: 6/28/21 MIDCAB  7/23 Readmit with pericardial effusion s/p IR drain, 200 cc    SUBJECTIVE ASSESSMENT:  66y Male assessed at bedside with help of daughter to interpret (Cantonese speaking). Patient states he feels better today. Has some discomfort where the pericardial drain is but denies significant pain. Denies SOB, says his breathing is better today than it has been for the past few days. Also notes that prior to readmission he was having nausea with food consumption and that has resolved. He is using his IS (1000 cc) and having regular BMs.    Vital Signs Last 24 Hrs  T(C): 36.7 (24 Jul 2021 14:16), Max: 36.7 (23 Jul 2021 20:20)  T(F): 98.1 (24 Jul 2021 14:16), Max: 98.1 (23 Jul 2021 20:20)  HR: 89 (24 Jul 2021 14:02) (89 - 120)  BP: 106/64 (24 Jul 2021 14:02) (102/69 - 138/81)  BP(mean): 93 (24 Jul 2021 05:59) (85 - 102)  RR: 16 (24 Jul 2021 14:02) (15 - 18)  SpO2: 99% (24 Jul 2021 14:02) (97% - 100%)  I&O's Detail    23 Jul 2021 07:01  -  24 Jul 2021 07:00  --------------------------------------------------------  IN:  Total IN: 0 mL    OUT:    Voided (mL): 750 mL  Total OUT: 750 mL    Total NET: -750 mL    24 Jul 2021 07:01  -  24 Jul 2021 14:53  --------------------------------------------------------  IN:    Oral Fluid: 180 mL  Total IN: 180 mL    OUT:    Voided (mL): 300 mL  Total OUT: 300 mL    Total NET: -120 mL    CHEST TUBE:  Yes, pericardial drain.   ELI DRAIN:  No.  EPICARDIAL WIRES: No.  TIE DOWNS: No.  CARBAJAL: No.    Physical Exam  CONSTITUTIONAL: Well appearing in NAD assessed sitting comfortably at bedside   NEURO: A&OX3. No focal deficits noted, moving bilateral upper and lower extremities                    CV: RRR, no murmurs, rubs, gallops  RESPIRATORY: Clear to auscultation bilateral posterior lung fields, no wheezes, rales, rhonchi   GI: +BS, NT/ND  MUSKULOSKELETAL: No peripheral edema or calf tenderness. Full strength and ROM bilateral upper and lower extremities   VASCULAR: Bilateral distal pulses 2+  INCISIONS: L mini thoracotomy healing well.     LABS:                        10.8   6.45  )-----------( 161      ( 24 Jul 2021 06:49 )             35.6       COUMADIN:  No    PT/INR - ( 24 Jul 2021 06:49 )   PT: 12.2 sec;   INR: 1.02          PTT - ( 24 Jul 2021 06:49 )  PTT:26.4 sec    07-24    136  |  106  |  18  ----------------------------<  82  4.1   |  24  |  1.08    Ca    9.0      24 Jul 2021 06:49  Mg     1.9     07-24    TPro  7.2  /  Alb  3.8  /  TBili  0.4  /  DBili  x   /  AST  15  /  ALT  18  /  AlkPhos  79  07-23    MEDICATIONS  (STANDING):  aspirin enteric coated 81 milliGRAM(s) Oral daily  atorvastatin 40 milliGRAM(s) Oral at bedtime  dextrose 40% Gel 15 Gram(s) Oral once  dextrose 5%. 1000 milliLiter(s) (50 mL/Hr) IV Continuous <Continuous>  dextrose 5%. 1000 milliLiter(s) (100 mL/Hr) IV Continuous <Continuous>  dextrose 50% Injectable 25 Gram(s) IV Push once  dextrose 50% Injectable 12.5 Gram(s) IV Push once  dextrose 50% Injectable 25 Gram(s) IV Push once  gabapentin 100 milliGRAM(s) Oral daily  glucagon  Injectable 1 milliGRAM(s) IntraMuscular once  heparin   Injectable 5000 Unit(s) SubCutaneous every 8 hours  insulin lispro (ADMELOG) corrective regimen sliding scale   SubCutaneous Before meals and at bedtime  metoprolol succinate  milliGRAM(s) Oral daily  pantoprazole    Tablet 40 milliGRAM(s) Oral before breakfast  sodium chloride 0.9% lock flush 3 milliLiter(s) IV Push every 8 hours    MEDICATIONS  (PRN):      RADIOLOGY & ADDITIONAL TESTS:  < from: Xray Chest 1 View- PORTABLE-Routine (Xray Chest 1 View- PORTABLE-Routine in AM.) (07.24.21 @ 08:03) >  IMPRESSION: Status post placement left chest tube. Small left effusion    < end of copied text >

## 2021-07-25 LAB
GLUCOSE BLDC GLUCOMTR-MCNC: 118 MG/DL — HIGH (ref 70–99)
GLUCOSE BLDC GLUCOMTR-MCNC: 142 MG/DL — HIGH (ref 70–99)
GLUCOSE BLDC GLUCOMTR-MCNC: 309 MG/DL — HIGH (ref 70–99)
GLUCOSE BLDC GLUCOMTR-MCNC: 353 MG/DL — HIGH (ref 70–99)

## 2021-07-25 PROCEDURE — 71046 X-RAY EXAM CHEST 2 VIEWS: CPT | Mod: 26

## 2021-07-25 PROCEDURE — 71045 X-RAY EXAM CHEST 1 VIEW: CPT | Mod: 26,59

## 2021-07-25 RX ORDER — (INSULIN DEGLUDEC AND LIRAGLUTIDE) 100; 3.6 [IU]/ML; MG/ML
0 INJECTION, SOLUTION SUBCUTANEOUS
Qty: 0 | Refills: 0 | DISCHARGE

## 2021-07-25 RX ORDER — ENTECAVIR 0.5 MG/1
1 TABLET ORAL DAILY
Refills: 0 | Status: DISCONTINUED | OUTPATIENT
Start: 2021-07-25 | End: 2021-07-26

## 2021-07-25 RX ORDER — TAMSULOSIN HYDROCHLORIDE 0.4 MG/1
0.4 CAPSULE ORAL AT BEDTIME
Refills: 0 | Status: DISCONTINUED | OUTPATIENT
Start: 2021-07-25 | End: 2021-07-26

## 2021-07-25 RX ADMIN — Medication 3 UNIT(S): at 07:56

## 2021-07-25 RX ADMIN — SODIUM CHLORIDE 3 MILLILITER(S): 9 INJECTION INTRAMUSCULAR; INTRAVENOUS; SUBCUTANEOUS at 15:10

## 2021-07-25 RX ADMIN — Medication 81 MILLIGRAM(S): at 11:51

## 2021-07-25 RX ADMIN — GABAPENTIN 100 MILLIGRAM(S): 400 CAPSULE ORAL at 11:51

## 2021-07-25 RX ADMIN — Medication 3 UNIT(S): at 11:51

## 2021-07-25 RX ADMIN — ATORVASTATIN CALCIUM 40 MILLIGRAM(S): 80 TABLET, FILM COATED ORAL at 21:13

## 2021-07-25 RX ADMIN — Medication 3 UNIT(S): at 17:01

## 2021-07-25 RX ADMIN — PANTOPRAZOLE SODIUM 40 MILLIGRAM(S): 20 TABLET, DELAYED RELEASE ORAL at 07:06

## 2021-07-25 RX ADMIN — HEPARIN SODIUM 5000 UNIT(S): 5000 INJECTION INTRAVENOUS; SUBCUTANEOUS at 05:55

## 2021-07-25 RX ADMIN — HEPARIN SODIUM 5000 UNIT(S): 5000 INJECTION INTRAVENOUS; SUBCUTANEOUS at 21:13

## 2021-07-25 RX ADMIN — Medication 10: at 21:14

## 2021-07-25 RX ADMIN — HEPARIN SODIUM 5000 UNIT(S): 5000 INJECTION INTRAVENOUS; SUBCUTANEOUS at 14:00

## 2021-07-25 RX ADMIN — Medication 8: at 11:51

## 2021-07-25 RX ADMIN — TAMSULOSIN HYDROCHLORIDE 0.4 MILLIGRAM(S): 0.4 CAPSULE ORAL at 21:13

## 2021-07-25 RX ADMIN — SODIUM CHLORIDE 3 MILLILITER(S): 9 INJECTION INTRAMUSCULAR; INTRAVENOUS; SUBCUTANEOUS at 21:03

## 2021-07-25 RX ADMIN — SODIUM CHLORIDE 3 MILLILITER(S): 9 INJECTION INTRAMUSCULAR; INTRAVENOUS; SUBCUTANEOUS at 05:55

## 2021-07-25 RX ADMIN — Medication 100 MILLIGRAM(S): at 05:55

## 2021-07-25 RX ADMIN — INSULIN GLARGINE 10 UNIT(S): 100 INJECTION, SOLUTION SUBCUTANEOUS at 22:05

## 2021-07-25 NOTE — PROGRESS NOTE ADULT - ASSESSMENT
A/P:    66 y.o M, Cantonese speaking, PMHx HTN, T2DM (A1c 7.4), hepatitis B, PVD, CAD (s/p PCIs in 2012/2016), who underwent an uncomplicated MIDCAB with Dr. Egan on 6/28/21 and was ultimately discharged home on POD3. On 7/23 he presented to his outpatient cardiologist (Dr. Radha Leary) complaining of 2 days of chest pain, SOB, and fatigue. An outpatient echocardiogram showed a moderate circumferential pericardial effusion with evidence of tamponade physiology. He was transferred to [facility] for evaluation and underwent IR drainage of pericardial effusion (200 cc removed), pericardial drain left in place. 7/24 drain remains in place, patient remains stable. 7/25 bedside TTE with no pericardial effusion. Pericardial drain removed at bedside.     Neurovascular:   - No delirium. Pain well controlled with current regimen.  -Continue tylenol PRN  - Continue gabapentin 100 mg daily     Cardiovascular:   - S/p MIDCAB 6/28, represented with circumfrential pericardial effusion  - S/p IR drainage 7/23 (200 cc), placement of pericardial drain  - Repeat TTE 7/25 with no pericardial effusion  - Pericardial drain removed 7/25 (5 cc output overnight)  -Hemodynamically stable. HR controlled (86-92)  - Hx of HTN, BP controlled (106//79), continue metoprolol  mg daily   - CAD s/p MIDCAB: discontinued plavix after pericardial effusion, last PCI 2016, continue ASA, continue atorvastatin 10 mg daily   - Plan for repeat TTE 7/26 to r/o re-accumulation of pericardial effusion     Respiratory:   - 02 Sat = 96% on RA.  -If on oxygen wean to RA from for O2 Sat > 93%.  -Encourage C+DB and Use of IS 10x / hr while awake.  -CXR without obvious pleural effusion    GI:   - Stable.  -Continue GI PPX with protonix  -PO Diet.    Renal / :  - BUN/Cr stable at 18/1.08  -Continue to monitor renal function.  -Monitor I/O's.  - Replete electrolytes PRN  - Continue tamsulosin 0.4 mg daily    Endocrine:    -A1c 7.4, known hx DM, on insulin at home, continue MISS, lantus 10, lispro 3  -TSH 1.130, no known hx of thyroid disease     Hematologic:  -H/H stable at 10.8/35.6 with no obvious signs of bleeding  -Coagulation Panel.    ID:  -Pt remains afebrile with no elevation in WBC or signs of infection  - Hx Hep B: continue entecavir 1 mg daily   -Continue to monitor CBC  -Observe for SIRS/Sepsis Syndrome.    Prophylaxis:  -DVT prophylaxis with 5000 SubQ Heparin q8h.  -SCD's    Disposition:  -Home when medically appropriate.

## 2021-07-25 NOTE — CHART NOTE - NSCHARTNOTEFT_GEN_A_CORE
Cardiology Fellow TTE Note    Called to bedside to evaluate for pericardial effusion s/p pericardial drain placement. Limited study performed and technically difficult. No pericardial effusion visualized. No RA or RV collapse noted. IVC dilated with >50% collapse. EF appears to be normal function. No prior images to compare.  Discussed with primary team. Follow up official read in the AM.       Liz Vargas DO  Cardiology Fellow, PGY-5

## 2021-07-25 NOTE — PROGRESS NOTE ADULT - SUBJECTIVE AND OBJECTIVE BOX
Patient discussed on morning rounds with Dr. Corey    Operation / Date: 6/28/21 MIDCAB  7/23 Readmit with pericardial effusion s/p IR drain, 200 cc    SUBJECTIVE ASSESSMENT:  66y Male assessed at bedside with [place]  ID [id number]. Patient with no acute complaints, feels better today. Does have some pain on the left side of his chest where the drain is. Denies SOB, fever, chills, nausea, vomiting. Is using his IS, pulling 1500 cc. Had a BM this morning.     Vital Signs Last 24 Hrs  T(C): 36.6 (25 Jul 2021 13:45), Max: 36.7 (24 Jul 2021 14:16)  T(F): 97.8 (25 Jul 2021 13:45), Max: 98.1 (24 Jul 2021 14:16)  HR: 84 (25 Jul 2021 08:30) (84 - 92)  BP: 116/71 (25 Jul 2021 08:30) (106/64 - 130/79)  BP(mean): 89 (25 Jul 2021 08:30) (88 - 99)  RR: 16 (25 Jul 2021 08:30) (16 - 18)  SpO2: 99% (25 Jul 2021 08:30) (95% - 99%)  I&O's Detail    24 Jul 2021 07:01  -  25 Jul 2021 07:00  --------------------------------------------------------  IN:    Oral Fluid: 180 mL  Total IN: 180 mL    OUT:    Bulb (mL): 15 mL    Voided (mL): 2475 mL  Total OUT: 2490 mL    Total NET: -2310 mL      25 Jul 2021 07:01  -  25 Jul 2021 13:52  --------------------------------------------------------  IN:    Oral Fluid: 200 mL  Total IN: 200 mL    OUT:  Total OUT: 0 mL    Total NET: 200 mL    CHEST TUBE:  No  ELI DRAIN:  No.  EPICARDIAL WIRES: No.  TIE DOWNS: No.  CARBAJAL: No.    Physical Exam  CONSTITUTIONAL: Well appearing in NAD assessed sitting comfortably at bedside   NEURO: A&OX3. No focal deficits noted, moving bilateral upper and lower extremities                    CV: RRR, no murmurs, rubs, gallops  RESPIRATORY: Clear to auscultation bilateral posterior lung fields, no wheezes, rales, rhonchi   GI: +BS, NT/ND  MUSKULOSKELETAL: No peripheral edema or calf tenderness. Full strength and ROM bilateral upper and lower extremities   VASCULAR: Bilateral distal pulses 2+  INCISIONS: L mini thoracotomy healing well.     LABS:                        10.8   6.45  )-----------( 161      ( 24 Jul 2021 06:49 )             35.6       COUMADIN:  No    PT/INR - ( 24 Jul 2021 06:49 )   PT: 12.2 sec;   INR: 1.02          PTT - ( 24 Jul 2021 06:49 )  PTT:26.4 sec    07-24    136  |  106  |  18  ----------------------------<  82  4.1   |  24  |  1.08    Ca    9.0      24 Jul 2021 06:49  Mg     1.9     07-24    TPro  7.2  /  Alb  3.8  /  TBili  0.4  /  DBili  x   /  AST  15  /  ALT  18  /  AlkPhos  79  07-23    MEDICATIONS  (STANDING):  aspirin enteric coated 81 milliGRAM(s) Oral daily  atorvastatin 40 milliGRAM(s) Oral at bedtime  dextrose 40% Gel 15 Gram(s) Oral once  dextrose 5%. 1000 milliLiter(s) (50 mL/Hr) IV Continuous <Continuous>  dextrose 5%. 1000 milliLiter(s) (100 mL/Hr) IV Continuous <Continuous>  dextrose 50% Injectable 25 Gram(s) IV Push once  dextrose 50% Injectable 12.5 Gram(s) IV Push once  dextrose 50% Injectable 25 Gram(s) IV Push once  entecavir 1 milliGRAM(s) Oral daily  gabapentin 100 milliGRAM(s) Oral daily  glucagon  Injectable 1 milliGRAM(s) IntraMuscular once  heparin   Injectable 5000 Unit(s) SubCutaneous every 8 hours  insulin glargine Injectable (LANTUS) 10 Unit(s) SubCutaneous at bedtime  insulin lispro (ADMELOG) corrective regimen sliding scale   SubCutaneous Before meals and at bedtime  insulin lispro Injectable (ADMELOG) 3 Unit(s) SubCutaneous three times a day before meals  metoprolol succinate  milliGRAM(s) Oral daily  pantoprazole    Tablet 40 milliGRAM(s) Oral before breakfast  sodium chloride 0.9% lock flush 3 milliLiter(s) IV Push every 8 hours  tamsulosin 0.4 milliGRAM(s) Oral at bedtime    MEDICATIONS  (PRN):  acetaminophen   Tablet .. 650 milliGRAM(s) Oral every 6 hours PRN Moderate Pain (4 - 6)        RADIOLOGY & ADDITIONAL TESTS:  < from: Xray Chest 1 View- PORTABLE-Routine (Xray Chest 1 View- PORTABLE-Routine in AM.) (07.25.21 @ 05:02) >  INTERPRETATION:  Clinical History: Pericardial drain    Frontal examination of the chest demonstrates no interval change lung pathology in comparison to prior examination of the chest 7/24/2021. No interval change this remaining support devices.    IMPRESSION: No interval change    < end of copied text >

## 2021-07-26 ENCOUNTER — TRANSCRIPTION ENCOUNTER (OUTPATIENT)
Age: 66
End: 2021-07-26

## 2021-07-26 VITALS — TEMPERATURE: 97 F

## 2021-07-26 LAB
ANION GAP SERPL CALC-SCNC: 8 MMOL/L — SIGNIFICANT CHANGE UP (ref 5–17)
APTT BLD: 41.2 SEC — HIGH (ref 27.5–35.5)
BUN SERPL-MCNC: 25 MG/DL — HIGH (ref 7–23)
CALCIUM SERPL-MCNC: 9.4 MG/DL — SIGNIFICANT CHANGE UP (ref 8.4–10.5)
CHLORIDE SERPL-SCNC: 106 MMOL/L — SIGNIFICANT CHANGE UP (ref 96–108)
CO2 SERPL-SCNC: 25 MMOL/L — SIGNIFICANT CHANGE UP (ref 22–31)
CREAT SERPL-MCNC: 1.12 MG/DL — SIGNIFICANT CHANGE UP (ref 0.5–1.3)
GLUCOSE BLDC GLUCOMTR-MCNC: 192 MG/DL — HIGH (ref 70–99)
GLUCOSE SERPL-MCNC: 185 MG/DL — HIGH (ref 70–99)
HCT VFR BLD CALC: 37.6 % — LOW (ref 39–50)
HGB BLD-MCNC: 11.7 G/DL — LOW (ref 13–17)
INR BLD: 0.98 — SIGNIFICANT CHANGE UP (ref 0.88–1.16)
MAGNESIUM SERPL-MCNC: 1.8 MG/DL — SIGNIFICANT CHANGE UP (ref 1.6–2.6)
MCHC RBC-ENTMCNC: 29.1 PG — SIGNIFICANT CHANGE UP (ref 27–34)
MCHC RBC-ENTMCNC: 31.1 GM/DL — LOW (ref 32–36)
MCV RBC AUTO: 93.5 FL — SIGNIFICANT CHANGE UP (ref 80–100)
NRBC # BLD: 0 /100 WBCS — SIGNIFICANT CHANGE UP (ref 0–0)
PLATELET # BLD AUTO: 204 K/UL — SIGNIFICANT CHANGE UP (ref 150–400)
POTASSIUM SERPL-MCNC: 4.2 MMOL/L — SIGNIFICANT CHANGE UP (ref 3.5–5.3)
POTASSIUM SERPL-SCNC: 4.2 MMOL/L — SIGNIFICANT CHANGE UP (ref 3.5–5.3)
PROTHROM AB SERPL-ACNC: 11.8 SEC — SIGNIFICANT CHANGE UP (ref 10.6–13.6)
RBC # BLD: 4.02 M/UL — LOW (ref 4.2–5.8)
RBC # FLD: 12.4 % — SIGNIFICANT CHANGE UP (ref 10.3–14.5)
SODIUM SERPL-SCNC: 139 MMOL/L — SIGNIFICANT CHANGE UP (ref 135–145)
WBC # BLD: 6.9 K/UL — SIGNIFICANT CHANGE UP (ref 3.8–10.5)
WBC # FLD AUTO: 6.9 K/UL — SIGNIFICANT CHANGE UP (ref 3.8–10.5)

## 2021-07-26 PROCEDURE — 86803 HEPATITIS C AB TEST: CPT

## 2021-07-26 PROCEDURE — 33017 PRCRD DRG 6YR+ W/O CGEN CAR: CPT

## 2021-07-26 PROCEDURE — 87205 SMEAR GRAM STAIN: CPT

## 2021-07-26 PROCEDURE — 85025 COMPLETE CBC W/AUTO DIFF WBC: CPT

## 2021-07-26 PROCEDURE — C1769: CPT

## 2021-07-26 PROCEDURE — 86769 SARS-COV-2 COVID-19 ANTIBODY: CPT

## 2021-07-26 PROCEDURE — 86901 BLOOD TYPING SEROLOGIC RH(D): CPT

## 2021-07-26 PROCEDURE — 82962 GLUCOSE BLOOD TEST: CPT

## 2021-07-26 PROCEDURE — 99285 EMERGENCY DEPT VISIT HI MDM: CPT | Mod: 25

## 2021-07-26 PROCEDURE — 93306 TTE W/DOPPLER COMPLETE: CPT

## 2021-07-26 PROCEDURE — 71046 X-RAY EXAM CHEST 2 VIEWS: CPT

## 2021-07-26 PROCEDURE — 80048 BASIC METABOLIC PNL TOTAL CA: CPT

## 2021-07-26 PROCEDURE — 99238 HOSP IP/OBS DSCHRG MGMT 30/<: CPT

## 2021-07-26 PROCEDURE — 84484 ASSAY OF TROPONIN QUANT: CPT

## 2021-07-26 PROCEDURE — 87635 SARS-COV-2 COVID-19 AMP PRB: CPT

## 2021-07-26 PROCEDURE — 83735 ASSAY OF MAGNESIUM: CPT

## 2021-07-26 PROCEDURE — 86850 RBC ANTIBODY SCREEN: CPT

## 2021-07-26 PROCEDURE — 85610 PROTHROMBIN TIME: CPT

## 2021-07-26 PROCEDURE — 83036 HEMOGLOBIN GLYCOSYLATED A1C: CPT

## 2021-07-26 PROCEDURE — 87075 CULTR BACTERIA EXCEPT BLOOD: CPT

## 2021-07-26 PROCEDURE — 87070 CULTURE OTHR SPECIMN AEROBIC: CPT

## 2021-07-26 PROCEDURE — C1729: CPT

## 2021-07-26 PROCEDURE — 71045 X-RAY EXAM CHEST 1 VIEW: CPT

## 2021-07-26 PROCEDURE — 85027 COMPLETE CBC AUTOMATED: CPT

## 2021-07-26 PROCEDURE — 36415 COLL VENOUS BLD VENIPUNCTURE: CPT

## 2021-07-26 PROCEDURE — 86900 BLOOD TYPING SEROLOGIC ABO: CPT

## 2021-07-26 PROCEDURE — 85730 THROMBOPLASTIN TIME PARTIAL: CPT

## 2021-07-26 PROCEDURE — 84443 ASSAY THYROID STIM HORMONE: CPT

## 2021-07-26 PROCEDURE — 80053 COMPREHEN METABOLIC PANEL: CPT

## 2021-07-26 PROCEDURE — 93005 ELECTROCARDIOGRAM TRACING: CPT

## 2021-07-26 PROCEDURE — 93308 TTE F-UP OR LMTD: CPT | Mod: 26

## 2021-07-26 RX ORDER — ACETAMINOPHEN 500 MG
2 TABLET ORAL
Qty: 0 | Refills: 0 | DISCHARGE
Start: 2021-07-26

## 2021-07-26 RX ORDER — MAGNESIUM OXIDE 400 MG ORAL TABLET 241.3 MG
800 TABLET ORAL ONCE
Refills: 0 | Status: COMPLETED | OUTPATIENT
Start: 2021-07-26 | End: 2021-07-26

## 2021-07-26 RX ADMIN — PANTOPRAZOLE SODIUM 40 MILLIGRAM(S): 20 TABLET, DELAYED RELEASE ORAL at 06:59

## 2021-07-26 RX ADMIN — Medication 2: at 07:21

## 2021-07-26 RX ADMIN — Medication 100 MILLIGRAM(S): at 05:25

## 2021-07-26 RX ADMIN — SODIUM CHLORIDE 3 MILLILITER(S): 9 INJECTION INTRAMUSCULAR; INTRAVENOUS; SUBCUTANEOUS at 05:09

## 2021-07-26 RX ADMIN — HEPARIN SODIUM 5000 UNIT(S): 5000 INJECTION INTRAVENOUS; SUBCUTANEOUS at 05:25

## 2021-07-26 RX ADMIN — MAGNESIUM OXIDE 400 MG ORAL TABLET 800 MILLIGRAM(S): 241.3 TABLET ORAL at 07:49

## 2021-07-26 RX ADMIN — Medication 3 UNIT(S): at 07:21

## 2021-07-26 NOTE — DISCHARGE NOTE PROVIDER - NSDCFUADDAPPT_GEN_ALL_CORE_FT
-Please attend your discharge appointments.  -Please attend your discharge appointments.  -Please call Dr. Leary's office for an appointment for 1-2 weeks from discharge from the hospital.

## 2021-07-26 NOTE — DISCHARGE NOTE PROVIDER - HOSPITAL COURSE
67 y/o M, Cantonese speaking, PMHx HTN, T2DM (A1c 7.4), hepatitis B, PVD, CAD (s/p PCIs in 2012/2016), who underwent an uncomplicated MIDCAB with Dr. Egan on 6/28/21 and was ultimately discharged home on POD3. On 7/23 he presented to his outpatient cardiologist (Dr. Radha Leary) complaining of 2 days of chest pain, SOB, and fatigue. An outpatient echocardiogram showed a moderate circumferential pericardial effusion with evidence of tamponade physiology. He was transferred to [facility] for evaluation and underwent IR drainage of pericardial effusion (200 cc removed), pericardial drain left in place. 7/24 drain remained in place, patient remained stable. 7/25 bedside TTE with no pericardial effusion. Pericardial drain removed at bedside. 7/26 pt feeling well and per Dr. Egan is okay to be discharged home today. Pt feels ready to be discharged home.    Over 35 minutes was spent with the patient reviewing the discharge material including medications, follow up appointments, recovery, concerning symptoms, and how to contact their health care providers if they have questions 65 y/o M, Cantonese speaking, PMHx HTN, T2DM (A1c 7.4), hepatitis B, PVD, CAD (s/p PCIs in 2012/2016), who underwent an uncomplicated MIDCAB with Dr. Egan on 6/28/21 and was ultimately discharged home on POD3. On 7/23 he presented to his outpatient cardiologist (Dr. Radha Leary) complaining of 2 days of chest pain, SOB, and fatigue. An outpatient echocardiogram showed a moderate circumferential pericardial effusion with evidence of tamponade physiology. He was transferred to [facility] for evaluation and underwent IR drainage of pericardial effusion (200 cc removed), pericardial drain left in place. 7/24 drain remained in place, patient remained stable. 7/25 bedside TTE with no pericardial effusion. Pericardial drain removed at bedside. 7/26 pt feeling well and per Dr. Egan is okay to be discharged home today. Pt feels ready to be discharged home.    Over 35 minutes was spent with the patient reviewing the discharge material including medications, follow up appointments, recovery, concerning symptoms, and how to contact their health care providers if they have questions    CABG  Aspirin               [ x ] Yes  [  ] Contraindicated, Reason_______________________________  Beta-Blocker     [ x ] Yes  [  ]Contraindicated, Reason_______________________________  Statin                 [ x ] Yes  [  ] Contraindicated, Reason_______________________________

## 2021-07-26 NOTE — DISCHARGE NOTE NURSING/CASE MANAGEMENT/SOCIAL WORK - NSDCFUADDAPPT_GEN_ALL_CORE_FT
-Please attend your discharge appointments.  -Please call Dr. Leary's office for an appointment for 1-2 weeks from discharge from the hospital.

## 2021-07-26 NOTE — DISCHARGE NOTE PROVIDER - NSDCCPTREATMENT_GEN_ALL_CORE_FT
PRINCIPAL PROCEDURE  Procedure: Percutaneous endoscopic drainage of pericardial cavity  Findings and Treatment: percutaneous drainage of pericardial effusion

## 2021-07-26 NOTE — DISCHARGE NOTE PROVIDER - NSDCMRMEDTOKEN_GEN_ALL_CORE_FT
acarbose 100 mg oral tablet: 1 tab(s) orally 3 times a day  acetaminophen 325 mg oral tablet: 2 tab(s) orally every 6 hours, As needed, Moderate Pain (4 - 6)  Actos 15 mg oral tablet: 1 tab(s) orally once a day  aspirin 81 mg oral tablet, chewable: 1 tab(s) orally once a day  atorvastatin 40 mg oral tablet: 1 tab(s) orally once a day (at bedtime)  entecavir 1 mg oral tablet: 1 tab(s) orally once a day  gabapentin 100 mg oral capsule: 1 cap(s) orally once a day  glipiZIDE 10 mg oral tablet: 1 tab(s) orally once a day  Metoprolol Succinate  mg oral tablet, extended release: 1 tab(s) orally once a day  Ozempic (1 mg dose) 4 mg/3 mL subcutaneous solution: 1 milligram(s) subcutaneous once a week  tamsulosin 0.4 mg oral capsule: 1 cap(s) orally once a day  Tresiba FlexTouch 100 units/mL subcutaneous solution: 40 unit(s) subcutaneous once a day (at bedtime)  Vascepa 1 g oral capsule: 2 cap(s) orally 2 times a day  Xigduo XR 5 mg-500 mg oral tablet, extended release: 1 tab(s) orally once a day (in the morning)

## 2021-07-26 NOTE — DISCHARGE NOTE PROVIDER - PROVIDER TOKENS
PROVIDER:[TOKEN:[[id number]]],PROVIDER:[TOKEN:[[id number]]] PROVIDER:[TOKEN:[[id number]],SCHEDULEDAPPT:[08/03/2021],SCHEDULEDAPPTTIME:[10:45 AM]],PROVIDER:[TOKEN:[[id number]]]

## 2021-07-26 NOTE — DISCHARGE NOTE PROVIDER - NSDCCPCAREPLAN_GEN_ALL_CORE_FT
PRINCIPAL DISCHARGE DIAGNOSIS  Diagnosis: Pericardial effusion with cardiac tamponade  Assessment and Plan of Treatment:

## 2021-07-26 NOTE — DISCHARGE NOTE PROVIDER - CARE PROVIDERS DIRECT ADDRESSES
[email or web address]

## 2021-07-26 NOTE — PROGRESS NOTE ADULT - SUBJECTIVE AND OBJECTIVE BOX
Patient discussed on morning rounds with Dr. Egan     s/p pericardial drain on 7/23 for cardiac tamponade   s/p MIDCAB on 6/28    Surgeon: Dr. Egan     Referring Physician: Dr. Radha Leary     SUBJECTIVE ASSESSMENT  Pt is feeling well this morning and looking forward to going home. No complaints. Eating/drinkin well. Moving his bowels regularly. Denies any CP, palpitations, SOB, wheezing, abd pain, n/v/d/c, fevers or chills.    HOSPITAL COURSE:  65 y/o M, Cantonese speaking, PMHx HTN, T2DM (A1c 7.4), hepatitis B, PVD, CAD (s/p PCIs in 2012/2016), who underwent an uncomplicated MIDCAB with Dr. Egan on 6/28/21 and was ultimately discharged home on POD3. On 7/23 he presented to his outpatient cardiologist (Dr. Radha Leary) complaining of 2 days of chest pain, SOB, and fatigue. An outpatient echocardiogram showed a moderate circumferential pericardial effusion with evidence of tamponade physiology. He was transferred to [facility] for evaluation and underwent IR drainage of pericardial effusion (200 cc removed), pericardial drain left in place. 7/24 drain remained in place, patient remained stable. 7/25 bedside TTE with no pericardial effusion. Pericardial drain removed at bedside. 7/26 pt feeling well and per Dr. Egan is okay to be discharged home today. Pt feels ready to be discharged home.    Over 35 minutes was spent with the patient reviewing the discharge material including medications, follow up appointments, recovery, concerning symptoms, and how to contact their health care providers if they have questions    CABG  Aspirin               [ x ] Yes  [  ] Contraindicated, Reason_______________________________  Beta-Blocker     [ x ] Yes  [  ]Contraindicated, Reason_______________________________  Statin                 [ x ] Yes  [  ] Contraindicated, Reason_______________________________    Vital Signs Last 24 Hrs  T(C): 36.1 (26 Jul 2021 09:42), Max: 36.7 (26 Jul 2021 05:14)  T(F): 97 (26 Jul 2021 09:42), Max: 98.1 (26 Jul 2021 05:14)  HR: 94 (26 Jul 2021 08:36) (83 - 98)  BP: 110/63 (26 Jul 2021 08:36) (110/63 - 128/71)  BP(mean): 82 (26 Jul 2021 08:36) (82 - 92)  RR: 18 (26 Jul 2021 08:36) (16 - 18)  SpO2: 98% (26 Jul 2021 08:36) (95% - 98%)    EPICARDIAL WIRES REMOVED: no  TIE DOWNS REMOVED: no    PHYSICAL EXAM:  General: well appearing is sitting in chair in NAD   Neurological: AOx3. Motor skills grossly intact  Cardiovascular: Normal S1/S2. Regular rate/rhythm. No murmurs  Respiratory: Lungs CTA bilaterally. No wheezing or rales  Gastrointestinal: +BS in all 4 quadrants. Non-distended. Soft. Non-tender  Extremities: Strength 5/5 b/l upper/lower extremities. Sensation grossly intact upper/lower extremities. No edema. No calf tenderness.  Vascular: Radial 2+bilaterally, DP 2+ b/l  Incision Sites: left inframammary thoracotomy incisions without erythema, purulence or ecchymosis. Previous pericardial drain site healing well.       LABS:                        11.7   6.90  )-----------( 204      ( 26 Jul 2021 06:27 )             37.6       COUMADIN:  no    PT/INR - ( 26 Jul 2021 06:27 )   PT: 11.8 sec;   INR: 0.98     PTT - ( 26 Jul 2021 06:27 )  PTT:41.2 sec    07-26    139  |  106  |  25<H>  ----------------------------<  185<H>  4.2   |  25  |  1.12    Ca    9.4      26 Jul 2021 06:27  Mg     1.8     07-26    Discharge CXR:  < from: Xray Chest 1 View- PORTABLE-Routine (Xray Chest 1 View- PORTABLE-Routine in AM.) (07.25.21 @ 05:02) >  IMPRESSION: No interval change  < end of copied text >

## 2021-07-26 NOTE — PROGRESS NOTE ADULT - ASSESSMENT
D/C Instructions:     DASH Diet:  1. Grains: 6-8 servings per day. Examples: Whole grains/pasta, brown rice.  2. Fruits and Vegetables: 4-5 servings per day each.  3. Dairy: 2-3 servings per day. Examples: Low-fat or fat free yogurt, low-fat or skim milk or cheeses.   4. Lean Meat (Fish & Poultry): No more than 6oz. in a day. Avoid Red Meat.   5. Nuts/seeds: 4-5 servings per WEEK. Examples: Almonds, sunflower seeds, lentils. Avoid salted nuts.   6. Fats/oils: 2-3 servings per day. Examples: Olive oil, fat-free low-sodium spreads. Avoid fried foods, lard or butter.  7. Sweets: Sparingly, less than 2-3 servings per week    -No heavy lifting/straining, Showering allowed, Stairs allowed, Walking - Indoors allowed, Walking - Outdoors allowed    -Walk daily as tolerated and use your incentive spirometer 10 times every hour while you are awake.     -Please weigh yourself daily. If you notice over a 3 pound weight gain in 3 days, this is a sign you are likely retaining too much fluid. It is imperative you call our right away with unexplained rapid weight gain.      -Please continue to wear the compression stockings given to you in the hospital at home. This is a way to prevent fluid from building up in your legs.     -No driving or strenuous activity/exercise until cleared by your surgeon.    -Gently clean your incisions with unscented/antibacterial soap and water, pat dry.  You may leave them open to air.    -Call your doctor if you have shortness of breath, chest pain not relieved by pain medication, dizziness, fever >101.5, or increased redness or drainage from incisions.  Chidi Egan)  Cardiovascular Surgery  [address], [address] [place] [address]  Phone: [phone]  Fax: [phone]  Scheduled Appointment: 08/03/2021 10:45 AM    Radha Leary  CARDIOLOGY  [address] [facility],[address]  [place] [address]  Phone: [phone]  Fax: [phone]  Follow Up Time:    KING TORO ; 08/03/2021 ; NPP CT Surg [address]  -Please attend your discharge appointments.  -Please call Dr. Leary's office for an appointment for 1-2 weeks from discharge from the hospital.

## 2021-07-26 NOTE — DISCHARGE NOTE NURSING/CASE MANAGEMENT/SOCIAL WORK - PATIENT PORTAL LINK FT
You can access the FollowMyHealth Patient Portal offered by [facility] by registering at the following website: [email or web address]. By joining [facility] [employer]’s FollowMyHealth portal, you will also be able to view your health information using other applications (apps) compatible with our system.

## 2021-07-26 NOTE — DISCHARGE NOTE PROVIDER - INSTRUCTIONS
DASH Diet:  1. Grains: 6-8 servings per day. Examples: Whole grains/pasta, brown rice.  2. Fruits and Vegetables: 4-5 servings per day each.  3. Dairy: 2-3 servings per day. Examples: Low-fat or fat free yogurt, low-fat or skim milk or cheeses.   4. Lean Meat (Fish & Poultry): No more than 6oz. in a day. Avoid Red Meat.   5. Nuts/seeds: 4-5 servings per WEEK. Examples: Almonds, sunflower seeds, lentils. Avoid salted nuts.   6. Fats/oils: 2-3 servings per day. Examples: Olive oil, fat-free low-sodium spreads. Avoid fried foods, lard or butter.  7. Sweets: Sparingly, less than 2-3 servings per week

## 2021-07-27 RX ORDER — INSULIN DEGLUDEC INJECTION 100 U/ML
100 INJECTION, SOLUTION SUBCUTANEOUS AT BEDTIME
Refills: 0 | Status: ACTIVE | COMMUNITY
Start: 2021-07-27

## 2021-07-27 RX ORDER — CHOLECALCIFEROL (VITAMIN D3) 125 MCG
TABLET ORAL WEEKLY
Refills: 0 | Status: DISCONTINUED | COMMUNITY
End: 2021-07-27

## 2021-07-27 RX ORDER — ICOSAPENT ETHYL 1000 MG/1
1 CAPSULE ORAL TWICE DAILY
Refills: 0 | Status: ACTIVE | COMMUNITY
Start: 2021-07-27

## 2021-07-27 RX ORDER — SEMAGLUTIDE 1.34 MG/ML
4 INJECTION, SOLUTION SUBCUTANEOUS WEEKLY
Refills: 0 | Status: ACTIVE | COMMUNITY
Start: 2021-07-27

## 2021-07-27 RX ORDER — CLOPIDOGREL 75 MG/1
75 TABLET, FILM COATED ORAL DAILY
Qty: 30 | Refills: 0 | Status: DISCONTINUED | COMMUNITY
End: 2021-07-27

## 2021-07-27 RX ORDER — GLIPIZIDE 10 MG/1
10 TABLET ORAL DAILY
Qty: 30 | Refills: 0 | Status: ACTIVE | COMMUNITY

## 2021-07-27 RX ORDER — PIOGLITAZONE 15 MG/1
15 TABLET ORAL DAILY
Refills: 0 | Status: ACTIVE | COMMUNITY

## 2021-07-27 RX ORDER — (INSULIN DEGLUDEC AND LIRAGLUTIDE) 100; 3.6 [IU]/ML; MG/ML
100-3.6 INJECTION, SOLUTION SUBCUTANEOUS
Refills: 0 | Status: DISCONTINUED | COMMUNITY
End: 2021-07-27

## 2021-07-27 RX ORDER — OXYCODONE 5 MG/1
5 TABLET ORAL
Refills: 0 | Status: DISCONTINUED | COMMUNITY
End: 2021-07-27

## 2021-07-27 RX ORDER — PANTOPRAZOLE 40 MG/1
40 TABLET, DELAYED RELEASE ORAL DAILY
Refills: 0 | Status: DISCONTINUED | COMMUNITY
End: 2021-07-27

## 2021-07-27 RX ORDER — ENTECAVIR 1 MG/1
1 TABLET, FILM COATED ORAL DAILY
Refills: 0 | Status: ACTIVE | COMMUNITY

## 2021-07-27 RX ORDER — ATORVASTATIN CALCIUM 40 MG/1
40 TABLET, FILM COATED ORAL AT BEDTIME
Qty: 30 | Refills: 5 | Status: ACTIVE | COMMUNITY

## 2021-07-27 RX ORDER — TAMSULOSIN HYDROCHLORIDE 0.4 MG/1
0.4 CAPSULE ORAL DAILY
Refills: 0 | Status: ACTIVE | COMMUNITY
Start: 2021-07-27

## 2021-07-27 RX ORDER — METOPROLOL SUCCINATE 100 MG/1
100 TABLET, EXTENDED RELEASE ORAL DAILY
Refills: 0 | Status: ACTIVE | COMMUNITY
Start: 2021-07-27

## 2021-07-27 RX ORDER — GABAPENTIN 100 MG/1
100 CAPSULE ORAL DAILY
Refills: 0 | Status: ACTIVE | COMMUNITY

## 2021-07-27 RX ORDER — METOPROLOL TARTRATE 50 MG/1
50 TABLET, FILM COATED ORAL
Qty: 60 | Refills: 3 | Status: DISCONTINUED | COMMUNITY
End: 2021-07-27

## 2021-07-27 RX ORDER — DAPAGLIFLOZIN AND METFORMIN HYDROCHLORIDE 5; 500 MG/1; MG/1
5-500 TABLET, FILM COATED, EXTENDED RELEASE ORAL DAILY
Refills: 0 | Status: ACTIVE | COMMUNITY

## 2021-07-27 RX ORDER — ASPIRIN ENTERIC COATED TABLETS 81 MG 81 MG/1
81 TABLET, DELAYED RELEASE ORAL DAILY
Refills: 1 | Status: ACTIVE | COMMUNITY

## 2021-07-27 RX ORDER — ACARBOSE 100 MG/1
100 TABLET ORAL 3 TIMES DAILY
Refills: 0 | Status: ACTIVE | COMMUNITY

## 2021-07-28 LAB
CULTURE RESULTS: NO GROWTH — SIGNIFICANT CHANGE UP
SPECIMEN SOURCE: SIGNIFICANT CHANGE UP

## 2021-07-30 ENCOUNTER — TRANSCRIPTION ENCOUNTER (OUTPATIENT)
Age: 66
End: 2021-07-30

## 2021-08-03 ENCOUNTER — OUTPATIENT (OUTPATIENT)
Dept: OUTPATIENT SERVICES | Facility: HOSPITAL | Age: 66
LOS: 1 days | End: 2021-08-03
Payer: MEDICARE

## 2021-08-03 ENCOUNTER — APPOINTMENT (OUTPATIENT)
Dept: CARDIOTHORACIC SURGERY | Facility: CLINIC | Age: 66
End: 2021-08-03
Payer: MEDICARE

## 2021-08-03 VITALS
HEIGHT: 60 IN | OXYGEN SATURATION: 95 % | RESPIRATION RATE: 17 BRPM | WEIGHT: 172 LBS | SYSTOLIC BLOOD PRESSURE: 111 MMHG | DIASTOLIC BLOOD PRESSURE: 62 MMHG | TEMPERATURE: 98.5 F | BODY MASS INDEX: 33.77 KG/M2 | HEART RATE: 98 BPM

## 2021-08-03 DIAGNOSIS — Z98.890 OTHER SPECIFIED POSTPROCEDURAL STATES: ICD-10-CM

## 2021-08-03 DIAGNOSIS — Z98.890 OTHER SPECIFIED POSTPROCEDURAL STATES: Chronic | ICD-10-CM

## 2021-08-03 PROCEDURE — 99024 POSTOP FOLLOW-UP VISIT: CPT

## 2021-08-03 PROCEDURE — 71046 X-RAY EXAM CHEST 2 VIEWS: CPT | Mod: 26

## 2021-08-03 PROCEDURE — 71046 X-RAY EXAM CHEST 2 VIEWS: CPT

## 2021-08-03 RX ORDER — METHYLPREDNISOLONE 4 MG/1
4 TABLET ORAL
Qty: 1 | Refills: 0 | Status: ACTIVE | COMMUNITY
Start: 1900-01-01 | End: 1900-01-01

## 2021-09-14 ENCOUNTER — APPOINTMENT (OUTPATIENT)
Dept: CARDIOTHORACIC SURGERY | Facility: CLINIC | Age: 66
End: 2021-09-14
Payer: MEDICARE

## 2021-09-14 VITALS
RESPIRATION RATE: 17 BRPM | BODY MASS INDEX: 33.59 KG/M2 | HEART RATE: 91 BPM | DIASTOLIC BLOOD PRESSURE: 74 MMHG | OXYGEN SATURATION: 96 % | WEIGHT: 172 LBS | SYSTOLIC BLOOD PRESSURE: 126 MMHG | TEMPERATURE: 97.7 F

## 2021-09-14 PROCEDURE — 99024 POSTOP FOLLOW-UP VISIT: CPT

## 2021-10-25 VITALS
OXYGEN SATURATION: 93 % | HEIGHT: 67 IN | WEIGHT: 169.98 LBS | TEMPERATURE: 97 F | RESPIRATION RATE: 17 BRPM | DIASTOLIC BLOOD PRESSURE: 74 MMHG | HEART RATE: 87 BPM | SYSTOLIC BLOOD PRESSURE: 130 MMHG

## 2021-10-25 RX ORDER — CHLORHEXIDINE GLUCONATE 213 G/1000ML
1 SOLUTION TOPICAL ONCE
Refills: 0 | Status: DISCONTINUED | OUTPATIENT
Start: 2021-10-27 | End: 2021-11-10

## 2021-10-25 NOTE — H&P ADULT - HISTORY OF PRESENT ILLNESS
COVID:  Pharmacy: [facility] [address] [place] [address]  Cardiologist: Dr. Leary    66 y/o Cantonese speaking M, former smoker with PMH of HTN, HLD, DM, hepatitis B, PVD s/p PTA SFA and DEANNA, CAD s/p PCIs- RCA 2012 @ [place], PCI LAD and LCx 2016 @ [facility], s/p MIDCAB 6/2021 @ [facility] with Dr. Egan, cardiac tamponade in 7/2021 for which pt was admitted for pericardial drainage who presented to cardiologist Dr. Leary for follow up visit. Pt reports he feels better than before the MIDCAB however continues to have ALLRED with ambulating 3 blocks relieved with rest. Pt denies fever, chills, cough, CP, palpitations, PND/orthopnea, LE edema, abdominal pain, N/V/D, dizziness, syncope. ECHO 8/6/21: EF 55%, RV systolic function normal, normal LV diastolic dysfunction, trace MR, mild TR, no pericardial effusion  In light of pt's risk factors, known CAD and CCS class II anginal equivalent symptoms pt is referred to [facility] for staged PCI of LCx.     Cath history:  s/p cardiac cath on 5/26/21 ASHWIN proxRCA LM norm, proxLAD 95% ISR, proxLCx 80-85% proxRCA 85% in stent segment, LVEDP 13mmHg  COVID: 10/22 negative   Pharmacy: [facility] [address] [place] [address]  Cardiologist: Dr. Leary    66 y/o Cantonese speaking M, former smoker with PMH of HTN, HLD, DM, hepatitis B, PVD s/p PTA SFA and DEANNA, CAD s/p PCIs- RCA 2012 @ [place], PCI LAD and LCx 2016 @ [facility], s/p MIDCAB 6/2021 @ [facility] with Dr. Egan, cardiac tamponade in 7/2021 for which pt was admitted for pericardial drainage who presented to cardiologist Dr. Leary for follow up visit. Pt reports he feels better than before the MIDCAB however continues to have ALLRED with ambulating 3 blocks relieved with rest. Pt denies fever, chills, cough, CP, palpitations, PND/orthopnea, LE edema, abdominal pain, N/V/D, dizziness, syncope. ECHO 8/6/21: EF 55%, RV systolic function normal, normal LV diastolic dysfunction, trace MR, mild TR, no pericardial effusion  In light of pt's risk factors, known CAD and CCS class II anginal equivalent symptoms pt is referred to [facility] for staged PCI of LCx.     Cath history:  s/p cardiac cath on 5/26/21 ASHWIN proxRCA LM norm, proxLAD 95% ISR, proxLCx 80-85% proxRCA 85% in stent segment, LVEDP 13mmHg

## 2021-10-25 NOTE — H&P ADULT - NSICDXPASTMEDICALHX_GEN_ALL_CORE_FT
PAST MEDICAL HISTORY:  CAD (coronary artery disease)     Diabetes mellitus     Hepatitis B     History of BPH     Hyperlipidemia     Hypertension     Pericardial effusion     PVD (peripheral vascular disease)

## 2021-10-25 NOTE — H&P ADULT - NSHPSOCIALHISTORY_GEN_ALL_CORE
Pt smoked 5 cigarettes per day for >30 years, quit____, denies EtOH and illicit drug use. Pt smoked 5 cigarettes per day for >30 years, denies EtOH and illicit drug use.

## 2021-10-25 NOTE — H&P ADULT - NSICDXPASTSURGICALHX_GEN_ALL_CORE_FT
PAST SURGICAL HISTORY:  H/O eye surgery     S/P coronary artery bypass graft x 1 MIDCAB- LIMA-LAD

## 2021-10-25 NOTE — H&P ADULT - ASSESSMENT
66 y/o Hussain speaking M, former smoker with PMH of HTN, HLD, DM, hepatitis B, PVD s/p PTA SFA and DEANNA, CAD s/p PCIs- RCA 2012 @ [place], PCI LAD and LCx 2016 @ [facility], s/p MIDCAB 6/2021 @ [facility] with Dr. Egan, cardiac tamponade in 7/2021 for which pt was admitted for pericardial drainage who presented to cardiologist Dr. Leary for follow up visit.  In light of pt's risk factors, known CAD and CCS class II anginal equivalent symptoms pt is referred to [facility] for staged PCI of LCx.     -consent obtained using JAGDISH Nixon [id number]  -H/H stable, last dose of aspirin 81 mg 10/27 am, last dose of plavix 75 mg 10/26 PM. Loaded with plavix 75 mg x1  -EF 55%, pre cath fluids with NS @ 75 cc/hr      Risks & benefits of procedure and alternative therapy have been explained to the patient including but not limited to: allergic reaction, bleeding w/possible need for blood transfusion, infection, renal and vascular compromise, limb damage, arrhythmia, stroke, vessel dissection/perforation, Myocardial infarction, emergent CABG. Informed consent obtained and in chart.

## 2021-10-27 ENCOUNTER — OUTPATIENT (OUTPATIENT)
Dept: OUTPATIENT SERVICES | Facility: HOSPITAL | Age: 66
LOS: 1 days | Discharge: HOME | End: 2021-10-27
Payer: MEDICARE

## 2021-10-27 DIAGNOSIS — Z98.890 OTHER SPECIFIED POSTPROCEDURAL STATES: Chronic | ICD-10-CM

## 2021-10-27 DIAGNOSIS — Z95.1 PRESENCE OF AORTOCORONARY BYPASS GRAFT: Chronic | ICD-10-CM

## 2021-10-27 LAB
A1C WITH ESTIMATED AVERAGE GLUCOSE RESULT: 7.8 % — HIGH (ref 4–5.6)
ALBUMIN SERPL ELPH-MCNC: 4.1 G/DL — SIGNIFICANT CHANGE UP (ref 3.3–5)
ALP SERPL-CCNC: 77 U/L — SIGNIFICANT CHANGE UP (ref 40–120)
ALT FLD-CCNC: 16 U/L — SIGNIFICANT CHANGE UP (ref 10–45)
ANION GAP SERPL CALC-SCNC: 11 MMOL/L — SIGNIFICANT CHANGE UP (ref 5–17)
ANION GAP SERPL CALC-SCNC: 9 MMOL/L — SIGNIFICANT CHANGE UP (ref 5–17)
APTT BLD: 34.5 SEC — SIGNIFICANT CHANGE UP (ref 27.5–35.5)
AST SERPL-CCNC: 14 U/L — SIGNIFICANT CHANGE UP (ref 10–40)
BASOPHILS # BLD AUTO: 0.06 K/UL — SIGNIFICANT CHANGE UP (ref 0–0.2)
BASOPHILS NFR BLD AUTO: 0.7 % — SIGNIFICANT CHANGE UP (ref 0–2)
BILIRUB SERPL-MCNC: 0.2 MG/DL — SIGNIFICANT CHANGE UP (ref 0.2–1.2)
BUN SERPL-MCNC: 22 MG/DL — SIGNIFICANT CHANGE UP (ref 7–23)
BUN SERPL-MCNC: 25 MG/DL — HIGH (ref 7–23)
CALCIUM SERPL-MCNC: 9 MG/DL — SIGNIFICANT CHANGE UP (ref 8.4–10.5)
CALCIUM SERPL-MCNC: 9.7 MG/DL — SIGNIFICANT CHANGE UP (ref 8.4–10.5)
CHLORIDE SERPL-SCNC: 106 MMOL/L — SIGNIFICANT CHANGE UP (ref 96–108)
CHLORIDE SERPL-SCNC: 111 MMOL/L — HIGH (ref 96–108)
CHOLEST SERPL-MCNC: 149 MG/DL — SIGNIFICANT CHANGE UP
CK MB CFR SERPL CALC: 2 NG/ML — SIGNIFICANT CHANGE UP (ref 0–6.7)
CK SERPL-CCNC: 86 U/L — SIGNIFICANT CHANGE UP (ref 30–200)
CO2 SERPL-SCNC: 22 MMOL/L — SIGNIFICANT CHANGE UP (ref 22–31)
CO2 SERPL-SCNC: 23 MMOL/L — SIGNIFICANT CHANGE UP (ref 22–31)
CREAT SERPL-MCNC: 1.07 MG/DL — SIGNIFICANT CHANGE UP (ref 0.5–1.3)
CREAT SERPL-MCNC: 1.11 MG/DL — SIGNIFICANT CHANGE UP (ref 0.5–1.3)
EOSINOPHIL # BLD AUTO: 0.13 K/UL — SIGNIFICANT CHANGE UP (ref 0–0.5)
EOSINOPHIL NFR BLD AUTO: 1.5 % — SIGNIFICANT CHANGE UP (ref 0–6)
ESTIMATED AVERAGE GLUCOSE: 177 MG/DL — HIGH (ref 68–114)
GLUCOSE BLDC GLUCOMTR-MCNC: 103 MG/DL — HIGH (ref 70–99)
GLUCOSE SERPL-MCNC: 131 MG/DL — HIGH (ref 70–99)
GLUCOSE SERPL-MCNC: 199 MG/DL — HIGH (ref 70–99)
HCT VFR BLD CALC: 42.3 % — SIGNIFICANT CHANGE UP (ref 39–50)
HCT VFR BLD CALC: 48 % — SIGNIFICANT CHANGE UP (ref 39–50)
HDLC SERPL-MCNC: 48 MG/DL — SIGNIFICANT CHANGE UP
HGB BLD-MCNC: 13.7 G/DL — SIGNIFICANT CHANGE UP (ref 13–17)
HGB BLD-MCNC: 15.5 G/DL — SIGNIFICANT CHANGE UP (ref 13–17)
IMM GRANULOCYTES NFR BLD AUTO: 0.3 % — SIGNIFICANT CHANGE UP (ref 0–1.5)
INR BLD: 0.8 — LOW (ref 0.88–1.16)
LIPID PNL WITH DIRECT LDL SERPL: 72 MG/DL — SIGNIFICANT CHANGE UP
LYMPHOCYTES # BLD AUTO: 1.82 K/UL — SIGNIFICANT CHANGE UP (ref 1–3.3)
LYMPHOCYTES # BLD AUTO: 20.9 % — SIGNIFICANT CHANGE UP (ref 13–44)
MCHC RBC-ENTMCNC: 28.9 PG — SIGNIFICANT CHANGE UP (ref 27–34)
MCHC RBC-ENTMCNC: 29 PG — SIGNIFICANT CHANGE UP (ref 27–34)
MCHC RBC-ENTMCNC: 32.3 GM/DL — SIGNIFICANT CHANGE UP (ref 32–36)
MCHC RBC-ENTMCNC: 32.4 GM/DL — SIGNIFICANT CHANGE UP (ref 32–36)
MCV RBC AUTO: 89.2 FL — SIGNIFICANT CHANGE UP (ref 80–100)
MCV RBC AUTO: 89.7 FL — SIGNIFICANT CHANGE UP (ref 80–100)
MONOCYTES # BLD AUTO: 0.64 K/UL — SIGNIFICANT CHANGE UP (ref 0–0.9)
MONOCYTES NFR BLD AUTO: 7.3 % — SIGNIFICANT CHANGE UP (ref 2–14)
NEUTROPHILS # BLD AUTO: 6.04 K/UL — SIGNIFICANT CHANGE UP (ref 1.8–7.4)
NEUTROPHILS NFR BLD AUTO: 69.3 % — SIGNIFICANT CHANGE UP (ref 43–77)
NON HDL CHOLESTEROL: 101 MG/DL — SIGNIFICANT CHANGE UP
NRBC # BLD: 0 /100 WBCS — SIGNIFICANT CHANGE UP (ref 0–0)
NRBC # BLD: 0 /100 WBCS — SIGNIFICANT CHANGE UP (ref 0–0)
PLATELET # BLD AUTO: 177 K/UL — SIGNIFICANT CHANGE UP (ref 150–400)
PLATELET # BLD AUTO: 195 K/UL — SIGNIFICANT CHANGE UP (ref 150–400)
POTASSIUM SERPL-MCNC: 4.2 MMOL/L — SIGNIFICANT CHANGE UP (ref 3.5–5.3)
POTASSIUM SERPL-MCNC: 4.3 MMOL/L — SIGNIFICANT CHANGE UP (ref 3.5–5.3)
POTASSIUM SERPL-SCNC: 4.2 MMOL/L — SIGNIFICANT CHANGE UP (ref 3.5–5.3)
POTASSIUM SERPL-SCNC: 4.3 MMOL/L — SIGNIFICANT CHANGE UP (ref 3.5–5.3)
PROT SERPL-MCNC: 7.7 G/DL — SIGNIFICANT CHANGE UP (ref 6–8.3)
PROTHROM AB SERPL-ACNC: 9.7 SEC — LOW (ref 10.6–13.6)
RBC # BLD: 4.74 M/UL — SIGNIFICANT CHANGE UP (ref 4.2–5.8)
RBC # BLD: 5.35 M/UL — SIGNIFICANT CHANGE UP (ref 4.2–5.8)
RBC # FLD: 14 % — SIGNIFICANT CHANGE UP (ref 10.3–14.5)
RBC # FLD: 14.2 % — SIGNIFICANT CHANGE UP (ref 10.3–14.5)
SODIUM SERPL-SCNC: 139 MMOL/L — SIGNIFICANT CHANGE UP (ref 135–145)
SODIUM SERPL-SCNC: 143 MMOL/L — SIGNIFICANT CHANGE UP (ref 135–145)
TRIGL SERPL-MCNC: 144 MG/DL — SIGNIFICANT CHANGE UP
WBC # BLD: 7.38 K/UL — SIGNIFICANT CHANGE UP (ref 3.8–10.5)
WBC # BLD: 8.72 K/UL — SIGNIFICANT CHANGE UP (ref 3.8–10.5)
WBC # FLD AUTO: 7.38 K/UL — SIGNIFICANT CHANGE UP (ref 3.8–10.5)
WBC # FLD AUTO: 8.72 K/UL — SIGNIFICANT CHANGE UP (ref 3.8–10.5)

## 2021-10-27 PROCEDURE — 82962 GLUCOSE BLOOD TEST: CPT

## 2021-10-27 PROCEDURE — C1753: CPT

## 2021-10-27 PROCEDURE — C1725: CPT

## 2021-10-27 PROCEDURE — 80053 COMPREHEN METABOLIC PANEL: CPT

## 2021-10-27 PROCEDURE — 82553 CREATINE MB FRACTION: CPT

## 2021-10-27 PROCEDURE — 92978 ENDOLUMINL IVUS OCT C 1ST: CPT | Mod: LC

## 2021-10-27 PROCEDURE — 85730 THROMBOPLASTIN TIME PARTIAL: CPT

## 2021-10-27 PROCEDURE — 93005 ELECTROCARDIOGRAM TRACING: CPT

## 2021-10-27 PROCEDURE — 82550 ASSAY OF CK (CPK): CPT

## 2021-10-27 PROCEDURE — 80061 LIPID PANEL: CPT

## 2021-10-27 PROCEDURE — C9600: CPT | Mod: LC

## 2021-10-27 PROCEDURE — C1874: CPT

## 2021-10-27 PROCEDURE — 85610 PROTHROMBIN TIME: CPT

## 2021-10-27 PROCEDURE — 93458 L HRT ARTERY/VENTRICLE ANGIO: CPT | Mod: 26,59

## 2021-10-27 PROCEDURE — C1887: CPT

## 2021-10-27 PROCEDURE — 85027 COMPLETE CBC AUTOMATED: CPT

## 2021-10-27 PROCEDURE — 99153 MOD SED SAME PHYS/QHP EA: CPT

## 2021-10-27 PROCEDURE — 93458 L HRT ARTERY/VENTRICLE ANGIO: CPT | Mod: XU

## 2021-10-27 PROCEDURE — 93010 ELECTROCARDIOGRAM REPORT: CPT

## 2021-10-27 PROCEDURE — 80048 BASIC METABOLIC PNL TOTAL CA: CPT

## 2021-10-27 PROCEDURE — 92978 ENDOLUMINL IVUS OCT C 1ST: CPT | Mod: 26,LC

## 2021-10-27 PROCEDURE — 99152 MOD SED SAME PHYS/QHP 5/>YRS: CPT

## 2021-10-27 PROCEDURE — 92928 PRQ TCAT PLMT NTRAC ST 1 LES: CPT | Mod: LC

## 2021-10-27 PROCEDURE — C1769: CPT

## 2021-10-27 PROCEDURE — 85025 COMPLETE CBC W/AUTO DIFF WBC: CPT

## 2021-10-27 PROCEDURE — 83036 HEMOGLOBIN GLYCOSYLATED A1C: CPT

## 2021-10-27 PROCEDURE — C1894: CPT

## 2021-10-27 RX ORDER — EZETIMIBE 10 MG/1
1 TABLET ORAL
Qty: 0 | Refills: 0 | DISCHARGE

## 2021-10-27 RX ORDER — DAPAGLIFLOZIN AND METFORMIN HYDROCHLORIDE 10; 1000 MG/1; MG/1
1 TABLET, FILM COATED, EXTENDED RELEASE ORAL
Qty: 0 | Refills: 0 | DISCHARGE

## 2021-10-27 RX ORDER — SODIUM CHLORIDE 9 MG/ML
500 INJECTION INTRAMUSCULAR; INTRAVENOUS; SUBCUTANEOUS
Refills: 0 | Status: DISCONTINUED | OUTPATIENT
Start: 2021-10-27 | End: 2021-10-27

## 2021-10-27 RX ORDER — ASPIRIN/CALCIUM CARB/MAGNESIUM 324 MG
1 TABLET ORAL
Qty: 30 | Refills: 11
Start: 2021-10-27 | End: 2022-10-21

## 2021-10-27 RX ORDER — INSULIN DEGLUDEC 100 U/ML
40 INJECTION, SOLUTION SUBCUTANEOUS
Qty: 0 | Refills: 0 | DISCHARGE

## 2021-10-27 RX ORDER — ACARBOSE 25 MG/1
1 TABLET ORAL
Qty: 0 | Refills: 0 | DISCHARGE

## 2021-10-27 RX ORDER — ISOSORBIDE MONONITRATE 60 MG/1
0.5 TABLET, EXTENDED RELEASE ORAL
Qty: 0 | Refills: 0 | DISCHARGE

## 2021-10-27 RX ORDER — CLOPIDOGREL BISULFATE 75 MG/1
1 TABLET, FILM COATED ORAL
Qty: 30 | Refills: 11
Start: 2021-10-27 | End: 2022-10-21

## 2021-10-27 RX ORDER — DUTASTERIDE 0.5 MG/1
1 CAPSULE, LIQUID FILLED ORAL
Qty: 0 | Refills: 0 | DISCHARGE

## 2021-10-27 RX ORDER — PIOGLITAZONE HYDROCHLORIDE 15 MG/1
1 TABLET ORAL
Qty: 0 | Refills: 0 | DISCHARGE

## 2021-10-27 RX ORDER — CHOLECALCIFEROL (VITAMIN D3) 125 MCG
1 CAPSULE ORAL
Qty: 0 | Refills: 0 | DISCHARGE

## 2021-10-27 RX ORDER — CLOPIDOGREL BISULFATE 75 MG/1
1 TABLET, FILM COATED ORAL
Qty: 0 | Refills: 0 | DISCHARGE

## 2021-10-27 RX ORDER — INSULIN DEGLUDEC 100 U/ML
50 INJECTION, SOLUTION SUBCUTANEOUS
Qty: 0 | Refills: 0 | DISCHARGE

## 2021-10-27 RX ORDER — SODIUM CHLORIDE 9 MG/ML
500 INJECTION INTRAMUSCULAR; INTRAVENOUS; SUBCUTANEOUS
Refills: 0 | Status: DISCONTINUED | OUTPATIENT
Start: 2021-10-27 | End: 2021-11-10

## 2021-10-27 RX ORDER — SEMAGLUTIDE 0.68 MG/ML
1 INJECTION, SOLUTION SUBCUTANEOUS
Qty: 0 | Refills: 0 | DISCHARGE

## 2021-10-27 RX ORDER — METOPROLOL TARTRATE 50 MG
1 TABLET ORAL
Qty: 0 | Refills: 0 | DISCHARGE

## 2021-10-27 RX ORDER — ICOSAPENT ETHYL 500 MG/1
2 CAPSULE, LIQUID FILLED ORAL
Qty: 0 | Refills: 0 | DISCHARGE

## 2021-10-27 RX ORDER — ENTECAVIR 0.5 MG/1
1 TABLET ORAL
Qty: 0 | Refills: 0 | DISCHARGE

## 2021-10-27 RX ORDER — CLOPIDOGREL BISULFATE 75 MG/1
75 TABLET, FILM COATED ORAL ONCE
Refills: 0 | Status: COMPLETED | OUTPATIENT
Start: 2021-10-27 | End: 2021-10-27

## 2021-10-27 RX ORDER — TAMSULOSIN HYDROCHLORIDE 0.4 MG/1
1 CAPSULE ORAL
Qty: 0 | Refills: 0 | DISCHARGE

## 2021-10-27 RX ORDER — DAPAGLIFLOZIN AND METFORMIN HYDROCHLORIDE 10; 1000 MG/1; MG/1
2 TABLET, FILM COATED, EXTENDED RELEASE ORAL
Qty: 0 | Refills: 0 | DISCHARGE

## 2021-10-27 RX ORDER — LOSARTAN POTASSIUM 100 MG/1
1 TABLET, FILM COATED ORAL
Qty: 0 | Refills: 0 | DISCHARGE

## 2021-10-27 RX ORDER — OMEPRAZOLE 10 MG/1
1 CAPSULE, DELAYED RELEASE ORAL
Qty: 0 | Refills: 0 | DISCHARGE

## 2021-10-27 RX ADMIN — CLOPIDOGREL BISULFATE 75 MILLIGRAM(S): 75 TABLET, FILM COATED ORAL at 10:23

## 2021-10-27 RX ADMIN — SODIUM CHLORIDE 75 MILLILITER(S): 9 INJECTION INTRAMUSCULAR; INTRAVENOUS; SUBCUTANEOUS at 10:23

## 2021-10-27 NOTE — PROGRESS NOTE ADULT - SUBJECTIVE AND OBJECTIVE BOX
Interventional Cardiology PA Post PCI SDA Discharge Note      Patient without complaints. Ambulated and voided without difficulties    Afebrile, VSS    PRESCRIPTIONS/HOME MEDICATIONS:  acarbose 100 mg oral tablet: 1 tab(s) orally 3 times a day  Actos 15 mg oral tablet: 1 tab(s) orally once a day  aspirin 81 mg oral tablet, chewable: 1 tab(s) orally once a day  atorvastatin 40 mg oral tablet: 1 tab(s) orally once a day (at bedtime)  dutasteride 0.5 mg oral capsule: 1 cap(s) orally once a day  entecavir 1 mg oral tablet: 1 tab(s) orally once a day  gabapentin 100 mg oral capsule: 1 cap(s) orally 2 times a day, As Needed  glipiZIDE 10 mg oral tablet: 1 tab(s) orally 2 times a day  Imdur 30 mg oral tablet, extended release: 0.5 tab(s) orally once a day (in the morning)  losartan 50 mg oral tablet: 1 tab(s) orally once a day  Metoprolol Succinate  mg oral tablet, extended release: 1 tab(s) orally once a day  omeprazole 40 mg oral delayed release capsule: 1 cap(s) orally once a day  Ozempic (1 mg dose) 4 mg/3 mL subcutaneous solution: 1 milligram(s) subcutaneous once a week  Plavix 75 mg oral tablet: 1 tab(s) orally once a day  Tresiba FlexTouch 100 units/mL subcutaneous solution: 50 unit(s) subcutaneous once a day (at bedtime)  Vascepa 1 g oral capsule: 2 cap(s) orally 2 times a day  Vitamin D3 1250 mcg (50,000 intl units) oral capsule: 1 cap(s) orally once a week  Xigduo XR 5 mg-1000 mg oral tablet, extended release: 2 tab(s) orally once a day (in the morning)  Zetia 10 mg oral tablet: 1 tab(s) orally once a day        CURRENT MEDICATIONS:   chlorhexidine 4% Liquid 1 Application(s) Topical once  sodium chloride 0.9%. 500 milliLiter(s) IV Continuous <Continuous>        Ext: Left Radial : no hematoma, no bleeding, dressing; C/D/I    Pulses: intact RAD to baseline     A&P: 66 y/o Cantonese speaking M, former smoker with PMH of HTN, HLD, DM, hepatitis B, PVD s/p PTA SFA and DEANNA, CAD s/p PCIs- RCA 2012 @ [place], PCI LAD and LCx 2016 @ [facility], s/p MIDCAB 6/2021 @ [facility] with Dr. Egan, cardiac tamponade in 7/2021 for which pt was admitted for pericardial drainage who presented to cardiologist Dr. Leary for follow up visit. Pt reports he feels better than before the MIDCAB however continues to have ALLRED with ambulating 3 blocks relieved with rest. Pt denies fever, chills, cough, CP, palpitations, PND/orthopnea, LE edema, abdominal pain, N/V/D, dizziness, syncope. ECHO 8/6/21: EF 55%, RV systolic function normal, normal LV diastolic dysfunction, trace MR, mild TR, no pericardial effusion  In light of pt's risk factors, known CAD and CCS class II anginal equivalent symptoms pt is referred to [facility] for staged PCI of LCx.     S/p cardiac cath 10/27/21: ASHWIN pLCx (80%), LM patent stent with mild ISR, pLAD 90% ISR supplied distally by LIMA, patent pRCA stent, EDP 18mmHg, L radial access.     1. Follow-up with PMD/Cardiologist Dr. Leary in 72 hours.  2. Post procedure labs/EKG reviewed and stable.    3. Pt given instructions on importance of taking antiplatelet medication.    4. Stable for discharge as per attending Dr. Mcrae after bed rest, pt voids, groin/wrist stable and 30 minutes of ambulation.  5. Prescriptions for Aspirin/Plavix e-prescribed and submitted to patient's pharmacy Yes.  6. Patient will continue Atorvastatin 40mg daily.   7. Patient will continue All Other Home Medications.  8. Discharge forms signed and copies in chart   9.  Discharge Order Entered Yes.

## 2021-10-29 DIAGNOSIS — I25.110 ATHEROSCLEROTIC HEART DISEASE OF NATIVE CORONARY ARTERY WITH UNSTABLE ANGINA PECTORIS: ICD-10-CM

## 2021-10-29 DIAGNOSIS — I25.82 CHRONIC TOTAL OCCLUSION OF CORONARY ARTERY: ICD-10-CM

## 2021-10-29 DIAGNOSIS — R94.39 ABNORMAL RESULT OF OTHER CARDIOVASCULAR FUNCTION STUDY: ICD-10-CM

## 2021-10-29 DIAGNOSIS — I25.84 CORONARY ATHEROSCLEROSIS DUE TO CALCIFIED CORONARY LESION: ICD-10-CM

## 2023-06-05 NOTE — ED ADULT NURSE NOTE - CAS EDN DISCHARGE INTERVENTIONS
Writer is working referrals: patient was seen at ED on 6/4/23 for a closed nondisplaced fracture of metatarsal bone of left foot, unspecified metatarsal. Imaging is in system, no prior surgery. STAT referral in system.    Please advise if patient should see Dr. West or sports med such as Dr. Singh or Dr. Monreal.    Writer will call patient to schedule   IV intact